# Patient Record
Sex: FEMALE | Race: WHITE | Employment: OTHER | URBAN - METROPOLITAN AREA
[De-identification: names, ages, dates, MRNs, and addresses within clinical notes are randomized per-mention and may not be internally consistent; named-entity substitution may affect disease eponyms.]

---

## 2018-07-20 RX ORDER — GUAIFENESIN AND PHENYLEPHRINE HCL 400; 10 MG/1; MG/1
1 TABLET ORAL DAILY
COMMUNITY

## 2018-07-24 ENCOUNTER — ANESTHESIA EVENT (OUTPATIENT)
Dept: ENDOSCOPY | Age: 68
End: 2018-07-24
Payer: MEDICARE

## 2018-07-24 ENCOUNTER — HOSPITAL ENCOUNTER (OUTPATIENT)
Age: 68
Setting detail: OUTPATIENT SURGERY
Discharge: HOME | End: 2018-07-24
Attending: SPECIALIST | Admitting: SPECIALIST
Payer: MEDICARE

## 2018-07-24 ENCOUNTER — ANESTHESIA (OUTPATIENT)
Dept: ENDOSCOPY | Age: 68
End: 2018-07-24
Payer: MEDICARE

## 2018-07-24 VITALS
HEIGHT: 66 IN | OXYGEN SATURATION: 100 % | WEIGHT: 168 LBS | BODY MASS INDEX: 27 KG/M2 | SYSTOLIC BLOOD PRESSURE: 114 MMHG | TEMPERATURE: 97.9 F | RESPIRATION RATE: 15 BRPM | HEART RATE: 61 BPM | DIASTOLIC BLOOD PRESSURE: 62 MMHG

## 2018-07-24 PROCEDURE — 88305 TISSUE EXAM BY PATHOLOGIST: CPT | Performed by: SPECIALIST

## 2018-07-24 PROCEDURE — 74011250636 HC RX REV CODE- 250/636

## 2018-07-24 PROCEDURE — 74011000250 HC RX REV CODE- 250

## 2018-07-24 PROCEDURE — 74011250636 HC RX REV CODE- 250/636: Performed by: PHYSICIAN ASSISTANT

## 2018-07-24 PROCEDURE — 77030009426 HC FCPS BIOP ENDOSC BSC -B: Performed by: SPECIALIST

## 2018-07-24 PROCEDURE — 76040000019: Performed by: SPECIALIST

## 2018-07-24 PROCEDURE — 76060000031 HC ANESTHESIA FIRST 0.5 HR: Performed by: SPECIALIST

## 2018-07-24 RX ORDER — ATROPINE SULFATE 0.1 MG/ML
0.5 INJECTION INTRAVENOUS
Status: DISCONTINUED | OUTPATIENT
Start: 2018-07-24 | End: 2018-07-24 | Stop reason: HOSPADM

## 2018-07-24 RX ORDER — NALOXONE HYDROCHLORIDE 0.4 MG/ML
0.4 INJECTION, SOLUTION INTRAMUSCULAR; INTRAVENOUS; SUBCUTANEOUS
Status: DISCONTINUED | OUTPATIENT
Start: 2018-07-24 | End: 2018-07-24 | Stop reason: HOSPADM

## 2018-07-24 RX ORDER — FLUMAZENIL 0.1 MG/ML
0.2 INJECTION INTRAVENOUS
Status: DISCONTINUED | OUTPATIENT
Start: 2018-07-24 | End: 2018-07-24 | Stop reason: HOSPADM

## 2018-07-24 RX ORDER — SODIUM CHLORIDE 9 MG/ML
50 INJECTION, SOLUTION INTRAVENOUS CONTINUOUS
Status: DISCONTINUED | OUTPATIENT
Start: 2018-07-24 | End: 2018-07-24 | Stop reason: HOSPADM

## 2018-07-24 RX ORDER — FENTANYL CITRATE 50 UG/ML
25 INJECTION, SOLUTION INTRAMUSCULAR; INTRAVENOUS AS NEEDED
Status: DISCONTINUED | OUTPATIENT
Start: 2018-07-24 | End: 2018-07-24 | Stop reason: HOSPADM

## 2018-07-24 RX ORDER — EPINEPHRINE 0.1 MG/ML
1 INJECTION INTRACARDIAC; INTRAVENOUS
Status: DISCONTINUED | OUTPATIENT
Start: 2018-07-24 | End: 2018-07-24 | Stop reason: HOSPADM

## 2018-07-24 RX ORDER — DEXTROMETHORPHAN/PSEUDOEPHED 2.5-7.5/.8
1.2 DROPS ORAL
Status: DISCONTINUED | OUTPATIENT
Start: 2018-07-24 | End: 2018-07-24 | Stop reason: HOSPADM

## 2018-07-24 RX ORDER — PROPOFOL 10 MG/ML
INJECTION, EMULSION INTRAVENOUS
Status: DISCONTINUED | OUTPATIENT
Start: 2018-07-24 | End: 2018-07-24 | Stop reason: HOSPADM

## 2018-07-24 RX ORDER — LIDOCAINE HYDROCHLORIDE 20 MG/ML
INJECTION, SOLUTION EPIDURAL; INFILTRATION; INTRACAUDAL; PERINEURAL AS NEEDED
Status: DISCONTINUED | OUTPATIENT
Start: 2018-07-24 | End: 2018-07-24 | Stop reason: HOSPADM

## 2018-07-24 RX ORDER — MIDAZOLAM HYDROCHLORIDE 1 MG/ML
.25-5 INJECTION, SOLUTION INTRAMUSCULAR; INTRAVENOUS AS NEEDED
Status: DISCONTINUED | OUTPATIENT
Start: 2018-07-24 | End: 2018-07-24 | Stop reason: HOSPADM

## 2018-07-24 RX ORDER — PROPOFOL 10 MG/ML
INJECTION, EMULSION INTRAVENOUS AS NEEDED
Status: DISCONTINUED | OUTPATIENT
Start: 2018-07-24 | End: 2018-07-24 | Stop reason: HOSPADM

## 2018-07-24 RX ADMIN — PROPOFOL 120 MCG/KG/MIN: 10 INJECTION, EMULSION INTRAVENOUS at 10:39

## 2018-07-24 RX ADMIN — SODIUM CHLORIDE 50 ML/HR: 900 INJECTION, SOLUTION INTRAVENOUS at 09:33

## 2018-07-24 RX ADMIN — LIDOCAINE HYDROCHLORIDE 40 MG: 20 INJECTION, SOLUTION EPIDURAL; INFILTRATION; INTRACAUDAL; PERINEURAL at 10:39

## 2018-07-24 RX ADMIN — PROPOFOL 60 MG: 10 INJECTION, EMULSION INTRAVENOUS at 10:39

## 2018-07-24 NOTE — H&P
78 y.o. female for open access colonoscopy for screening   Additional data for completion of the targeted pre-endoscopy H&P will be provided under 'H&P interval notes'. Please see that document which will be attached to this. Silvino Casiano MD      History of UC, apparently well controlled with mesalamine therapy, last colonoscopy 5 years previously.

## 2018-07-24 NOTE — PROCEDURES
[address] [facility] TRI Robertson MD  [phone]      2018    Colonoscopy Procedure Note  Alicia Andrews  :  1950  Jose Armando Medical Record Number: [id number]    Indications:     Screening colonoscopy, Ulcerative colitis  PCP:  Arnav Crum DO  Anesthesia/Sedation: Conscious Sedation/Moderate Sedation  Endoscopist:  Dr. Yen Thakkar  Complications:  None  Estimated Blood Loss:  None    Permit:  The indications, risks, benefits and alternatives were reviewed with the patient or their decision maker who was provided an opportunity to ask questions and all questions were answered. The specific risks of colonoscopy with conscious sedation were reviewed, including but not limited to anesthetic complication, bleeding, adverse drug reaction, missed lesion, infection, IV site reactions, and intestinal perforation which would lead to the need for surgical repair. Alternatives to colonoscopy including radiographic imaging, observation without testing, or laboratory testing were reviewed including the limitations of those alternatives. After considering the options and having all their questions answered, the patient or their decision maker provided both verbal and written consent to proceed. Procedure in Detail:  After obtaining informed consent, positioning of the patient in the left lateral decubitus position, and conduction of a pre-procedure pause or \"time out\" the endoscope was introduced into the anus and advanced to the cecum, which was identified by the ileocecal valve and appendiceal orifice. The quality of the colonic preparation was good. A careful inspection was made as the colonoscope was withdrawn, findings and interventions are described below. Findings:   Completely healthy mucosa throughout the colon.   Cold forceps biopsies taken from left colon and right colon for histologic evaluation. Specimens:    See above    Complications:   None; patient tolerated the procedure well. Impression:  No polyps or other neoplasia and her chronic IBD is in endoscopic (gross) remisison. Recommendations:     - Await pathology. - Continue current medications     - Consider repeat colonoscopy in 5-10 years    Thank you for entrusting me with this patient's care. Please do not hesitate to contact me with any questions or if I can be of assistance with any of your other patients' GI needs.     Signed By: Blas May MD                        July 24, 2018

## 2018-07-24 NOTE — ANESTHESIA POSTPROCEDURE EVALUATION
Post-Anesthesia Evaluation and Assessment    Patient: Mindy Devlin MRN: [id number]  SSN: [id number]    YOB: 1950  Age: 78 y.o. Sex: female       Cardiovascular Function/Vital Signs  Visit Vitals    /66    Pulse 62    Temp 36.6 °C (97.9 °F)    Resp 14    Ht 5' 6\" (1.676 m)    Wt 76.2 kg (168 lb)    SpO2 99%    Breastfeeding No    BMI 27.12 kg/m2       Patient is status post MAC anesthesia for Procedure(s):  COLONOSCOPY  COLON BIOPSY. Nausea/Vomiting: None    Postoperative hydration reviewed and adequate. Pain:  Pain Scale 1: Numeric (0 - 10) (07/24/18 1116)  Pain Intensity 1: 0 (07/24/18 1116)   Managed    Neurological Status: At baseline    Mental Status and Level of Consciousness: Arousable    Pulmonary Status:   O2 Device: Room air (07/24/18 1116)   Adequate oxygenation and airway patent    Complications related to anesthesia: None    Post-anesthesia assessment completed.  No concerns    Signed By: Dwayne Palencia MD     July 24, 2018

## 2018-07-24 NOTE — IP AVS SNAPSHOT
[address] 
[phone] Patient: Komal Cuenca MRN: [id number] RXC:2/3/4265 About your hospitalization You were admitted on:  July 24, 2018 You last received care in the:  [facility] ENDOSCOPY You were discharged on:  July 24, 2018 Why you were hospitalized Your primary diagnosis was:  Not on File Follow-up Information None Discharge Orders None A check katelynn indicates which time of day the medication should be taken. My Medications CONTINUE taking these medications Instructions Each Dose to Equal  
 Morning Noon Evening Bedtime  
 acyclovir 400 mg tablet Commonly known as:  ZOVIRAX Your last dose was: Your next dose is: Take 400 mg by mouth two (2) times a day. 400 mg  
    
   
   
   
  
 AMBIEN 10 mg tablet Generic drug:  zolpidem Your last dose was: Your next dose is: Take 5 mg by mouth nightly as needed for Sleep.  
 5 mg  
    
   
   
   
  
 aspirin 81 mg chewable tablet Your last dose was: Your next dose is: Take 81 mg by mouth every Monday, Wednesday, Friday. 81 mg  
    
   
   
   
  
 atenolol 25 mg tablet Commonly known as:  TENORMIN Your last dose was: Your next dose is: Take 25 mg by mouth daily. 25 mg  
    
   
   
   
  
 calcium carbonate 500 mg calcium (1,250 mg) tablet Commonly known as:  OS-VICENTA Your last dose was: Your next dose is: Take  by mouth daily. CeleXA 20 mg tablet Generic drug:  citalopram  
   
Your last dose was: Your next dose is: Take 20 mg by mouth daily. 20 mg  
    
   
   
   
  
 cyclobenzaprine 5 mg tablet Commonly known as:  FLEXERIL Your last dose was: Your next dose is: Take 5 mg by mouth as needed for Muscle Spasm(s). 5 mg  
    
   
   
   
  
 LIPITOR 20 mg tablet Generic drug:  atorvastatin Your last dose was: Your next dose is: Take 20 mg by mouth daily. 20 mg  
    
   
   
   
  
 mesalamine ER 0.375 gram 24 hour capsule Commonly known as:  APRISO Your last dose was: Your next dose is: Take 1.5 g by mouth daily. 1.5 g  
    
   
   
   
  
 turmeric root extract 500 mg Cap Your last dose was: Your next dose is: Take 1 Cap by mouth daily. 1 Cap Discharge Instructions [address] [place]. Gwendolyn Orona, [address] [place] [address] 
[phone] July 24, 2018 Norm Age YOB: 1950 COLONOSCOPY DISCHARGE INSTRUCTIONS If there is redness at IV site you should apply warm compress to area. If redness or soreness persist contact Dr. Gwendolyn Orona' or your primary care doctor. There may be a slight amount of blood passed from the rectum. Gaseous discomfort may develop, but walking, belching will help relieve this. You may not operate a vehicle for 12 hours You may not operate machinery or dangerous appliances for rest of today You may not drink alcoholic beverages for 12 hours Avoid making any critical decisions for 24 hours DIET: 
You may resume your normal diet, but some patients find that heavy or large meals may lead to indigestion or vomiting. I suggest a light meal as first food intake. MEDICATIONS: 
The use of some over-the-counter pain medication may lead to bleeding after colon biopsies or polyp removal.  Tylenol (also called acetaminophen) is safe to take even if you have had colonoscopy with polyp removal.  Based on the procedure you had today you may not safely take aspirin or aspirin-like products for the next ten (10) days.   Remember that Tylenol (also called acetaminophen) is safe to take after colonoscopy even if you have had biopsies or polyps removed. ACTIVITY: 
You may resume your normal household activities, but it is recommended that you spend the remainder of the day resting -  avoid any strenuous activity. CALL DR. Jennifer Sharp' OFFICE IF: Increasing pain, nausea, vomiting Abdominal distension (swelling) Significant new or increased bleeding (oral or rectal) Fever/Chills Chest pain/shortness of breath Additional instructions:  
No aspirin 10 days We found no polyps and the inflammation is very well controlled - no abnormalities seen today at all Plan is that you continue your current medications and we'll do another colonoscopy in 5-10 years. It was an honor to be your doctor today. Please let me or my office staff know if you have any feedback about today's procedure. Stacy Love MD 
 
Colonoscopy saves lives, and can prevent colon cancer. Everyone aged 48 or older needs colonoscopy. Tell your family and friends: get the test! 
 
 
 
 
  
  
  
Introducing [employer]inza[employer]! [facility] introduces [employer] River patient portal. Now you can access parts of your medical record, email your doctor's office, and request medication refills online. 1. In your internet browser, go to [email or web address] 2. Click on the First Time User? Click Here link in the Sign In box. You will see the New Member Sign Up page. 3. Enter your Digital River Access Code exactly as it appears below. You will not need to use this code after youve completed the sign-up process. If you do not sign up before the expiration date, you must request a new code. · Digital River Access Code: [email or web address] Expires: 10/22/2018  9:01 AM 
 
4. Enter the last four digits of your Social Security Number (xxxx) and Date of Birth (mm/dd/yyyy) as indicated and click Submit. You will be taken to the next sign-up page. 5. Create a TennisHub ID. This will be your TennisHub login ID and cannot be changed, so think of one that is secure and easy to remember. 6. Create a TennisHub password. You can change your password at any time. 7. Enter your Password Reset Question and Answer. This can be used at a later time if you forget your password. 8. Enter your e-mail address. You will receive e-mail notification when new information is available in [address] 9. Click Sign Up. You can now view and download portions of your medical record. 10. Click the Download Summary menu link to download a portable copy of your medical information. If you have questions, please visit the Frequently Asked Questions section of the TennisHub website. Remember, TennisHub is NOT to be used for urgent needs. For medical emergencies, dial 911. Now available from your iPhone and Android! Introducing Ellis Richards As a [facility] patient, I wanted to make you aware of our electronic visit tool called Ellis Matthewlisandrokiara. [facility] 24/7 allows you to connect within minutes with a medical provider 24 hours a day, seven days a week via a mobile device or tablet or logging into a secure website from your computer. You can access Ellis Richards from anywhere in the [place]. A virtual visit might be right for you when you have a simple condition and feel like you just dont want to get out of bed, or cant get away from work for an appointment, when your regular [facility] provider is not available (evenings, weekends or holidays), or when youre out of town and need minor care. Electronic visits cost only $49 and if the [facility] [facility] 24/7 provider determines a prescription is needed to treat your condition, one can be electronically transmitted to a nearby pharmacy*. Please take a moment to enroll today if you have not already done so. The enrollment process is free and takes just a few minutes.   To enroll, please download the [employer] 24/7 yadira to your tablet or phone, or visit [email or web address] to enroll on your computer. And, as an [address] [place] [address] patient with a IntegriChain account, the results of your visits will be scanned into your electronic medical record and your primary care provider will be able to view the scanned results. We urge you to continue to see your regular [employer] provider for your ongoing medical care. And while your primary care provider may not be the one available when you seek a yuback virtual visit, the peace of mind you get from getting a real diagnosis real time can be priceless. For more information on yuback, view our Frequently Asked Questions (FAQs) at [email or web address]. Sincerely, 
 
Joshua Cardoso MD 
Chief Medical Officer [employer] *:  certain medications cannot be prescribed via yuback Providers Seen During Your Hospitalization Provider Specialty Primary office phone Marcial Maria MD Gastroenterology [phone] Your Primary Care Physician (PCP) Primary Care Physician Office Phone Office Fax Nerissa Marie [phone] You are allergic to the following Allergen Reactions Adhesive Tape-Silicones Contact Dermatitis Wellbutrin (Bupropion) Rash Recent Documentation Height Weight Breastfeeding? BMI OB Status Smoking Status 1.676 m 76.2 kg No 27.12 kg/m2 Postmenopausal Former Smoker Emergency Contacts Name Discharge Info Relation Home Work Mobile Ching Montague CAREGIVER [3] Spouse [3] [phone] Patient Belongings The following personal items are in your possession at time of discharge: 
  Dental Appliances: None  Visual Aid: Glasses, At bedside Please provide this summary of care documentation to your next provider. Signatures-by signing, you are acknowledging that this After Visit Summary has been reviewed with you and you have received a copy. Patient Signature:  ____________________________________________________________ Date:  ____________________________________________________________  
  
Althia Kras Provider Signature:  ____________________________________________________________ Date:  ____________________________________________________________

## 2018-07-24 NOTE — ROUTINE PROCESS
[facility]  1950  [id number]    Situation:  Verbal report received from: Marciano Aragon RN  Procedure: Procedure(s):  COLONOSCOPY  COLON BIOPSY    Background:    Preoperative diagnosis: FAMILY HISTORY OF MALIGNANT NEOPLASM OF GI TRACT, ULCERATIVE COLITIS  Postoperative diagnosis: * No post-op diagnosis entered *    :  Dr. Matilde Monahan  Assistant(s): Endoscopy Technician-1: Kashif Leggett  Endoscopy RN-1: Anyi Mitchell RN    Specimens:   ID Type Source Tests Collected by Time Destination   1 : Right Colon biopsy Preservative   Carole Kirby MD 7/24/2018 1053 Pathology   2 : Left Colon Biopsy Preservative   Carole Kirby MD 7/24/2018 1054 Pathology     H. Pylori  no    Assessment:  Intra-procedure medications     Anesthesia gave intra-procedure sedation and medications, see anesthesia flow sheet yes    Intravenous fluids: NS@ KVO     Vital signs stable     Abdominal assessment: round and soft     Recommendation:  Discharge patient per MD order. Family or Friend   Permission to share finding with family or friend yes    Endoscopy discharge instructions have been reviewed and given to patient and spouse. The patient and spouse verbalized understanding and acceptance of instructions.

## 2018-07-24 NOTE — DISCHARGE INSTRUCTIONS
[address] [facility] TRI Ellington MD  [phone]      July 24, 2018    Kamran Linn  YOB: 1950    COLONOSCOPY DISCHARGE INSTRUCTIONS    If there is redness at IV site you should apply warm compress to area. If redness or soreness persist contact Dr. Dionna Ellington' or your primary care doctor. There may be a slight amount of blood passed from the rectum. Gaseous discomfort may develop, but walking, belching will help relieve this. You may not operate a vehicle for 12 hours  You may not operate machinery or dangerous appliances for rest of today  You may not drink alcoholic beverages for 12 hours  Avoid making any critical decisions for 24 hours    DIET:  You may resume your normal diet, but some patients find that heavy or large meals may lead to indigestion or vomiting. I suggest a light meal as first food intake. MEDICATIONS:  The use of some over-the-counter pain medication may lead to bleeding after colon biopsies or polyp removal.  Tylenol (also called acetaminophen) is safe to take even if you have had colonoscopy with polyp removal.  Based on the procedure you had today you may not safely take aspirin or aspirin-like products for the next ten (10) days. Remember that Tylenol (also called acetaminophen) is safe to take after colonoscopy even if you have had biopsies or polyps removed. ACTIVITY:  You may resume your normal household activities, but it is recommended that you spend the remainder of the day resting -  avoid any strenuous activity.     CALL DR. Froilan Nobles' OFFICE IF:  Increasing pain, nausea, vomiting  Abdominal distension (swelling)  Significant new or increased bleeding (oral or rectal)  Fever/Chills  Chest pain/shortness of breath                       Additional instructions:   No aspirin 10 days  We found no polyps and the inflammation is very well controlled - no abnormalities seen today at all  Plan is that you continue your current medications and we'll do another colonoscopy in 5-10 years. It was an honor to be your doctor today. Please let me or my office staff know if you have any feedback about today's procedure. Ayden Clarke MD    Colonoscopy saves lives, and can prevent colon cancer. Everyone aged 48 or older needs colonoscopy.   Tell your family and friends: get the test!

## 2018-07-24 NOTE — ANESTHESIA PREPROCEDURE EVALUATION
Anesthetic History   No history of anesthetic complications            Review of Systems / Medical History  Patient summary reviewed and pertinent labs reviewed    Pulmonary        Sleep apnea: No treatment           Neuro/Psych         Psychiatric history     Cardiovascular            Dysrhythmias         Comments: Atrial tachycardua   GI/Hepatic/Renal     GERD           Endo/Other  Within defined limits           Other Findings              Physical Exam    Airway  Mallampati: II  TM Distance: 4 - 6 cm  Neck ROM: normal range of motion   Mouth opening: Normal     Cardiovascular  Regular rate and rhythm,  S1 and S2 normal,  no murmur, click, rub, or gallop  Rhythm: regular  Rate: normal         Dental  No notable dental hx       Pulmonary  Breath sounds clear to auscultation               Abdominal  GI exam deferred       Other Findings            Anesthetic Plan    ASA: 2  Anesthesia type: MAC          Induction: Intravenous  Anesthetic plan and risks discussed with: Patient

## 2018-07-24 NOTE — PROGRESS NOTES
Report received from Pb Graham (JHONATAN). See anesthesia note. ABD remains soft and non-tender post procedure. Pt has no complaints at this time and tolerated the procedure well. Endoscope was pre-cleaned at bedside immediately following procedure by Octavio Bernabe.

## 2020-01-03 NOTE — PERIOP NOTES
[address]  Endoscopy Preprocedure Instructions      1. On the day of your surgery, please report to registration located on the 2nd floor of the  [facility]. yes    2. You must have a responsible adult to drive you to the hospital, stay at the hospital during your procedure and drive you home. If they leave your procedure will not be started (no exceptions). yes    3. Do not have anything to eat or drink (including water, gum, mints, coffee, and juice) after midnight. This does not apply to the medications you were instructed to take by your physician. yesIf you are currently taking Plavix, Coumadin, Aspirin, or other blood-thinning agents, contact your physician for special instructions. yes,    4. If you are having a procedure that requires bowel prep: We recommend that you have only clear liquids the day before your procedure and begin your bowel prep by 5:00 pm.  You may continue to drink clear liquids until midnight. If for any reason you are not able to complete your prep please notify your physician immediately. yes    5. Have a list of all current medications, including vitamins, herbal supplements and any other over the counter medications. yes    6. If you wear glasses, contacts, dentures and/or hearing aids, they may be removed prior to procedure, please bring a case to store them in. yes    7. You should understand that if you do not follow these instructions your procedure may be cancelled. If your physical condition changes (I.e. fever, cold or flu) please contact your doctor as soon as possible. 8. It is important that you be on time.   If for any reason you are unable to keep your appointment please call [phone] the day of or your physicians office prior to your scheduled procedure

## 2020-01-07 ENCOUNTER — ANESTHESIA EVENT (OUTPATIENT)
Dept: ENDOSCOPY | Age: 70
End: 2020-01-07
Payer: MEDICARE

## 2020-01-07 ENCOUNTER — HOSPITAL ENCOUNTER (OUTPATIENT)
Age: 70
Setting detail: OUTPATIENT SURGERY
Discharge: HOME | End: 2020-01-07
Attending: SPECIALIST | Admitting: SPECIALIST
Payer: MEDICARE

## 2020-01-07 ENCOUNTER — ANESTHESIA (OUTPATIENT)
Dept: ENDOSCOPY | Age: 70
End: 2020-01-07
Payer: MEDICARE

## 2020-01-07 VITALS
BODY MASS INDEX: 27.6 KG/M2 | WEIGHT: 171.74 LBS | OXYGEN SATURATION: 100 % | SYSTOLIC BLOOD PRESSURE: 108 MMHG | RESPIRATION RATE: 18 BRPM | DIASTOLIC BLOOD PRESSURE: 64 MMHG | HEIGHT: 66 IN | HEART RATE: 64 BPM | TEMPERATURE: 97.7 F

## 2020-01-07 PROCEDURE — 76060000031 HC ANESTHESIA FIRST 0.5 HR: Performed by: SPECIALIST

## 2020-01-07 PROCEDURE — 74011250636 HC RX REV CODE- 250/636: Performed by: NURSE ANESTHETIST, CERTIFIED REGISTERED

## 2020-01-07 PROCEDURE — 77030013992 HC SNR POLYP ENDOSC BSC -B: Performed by: SPECIALIST

## 2020-01-07 PROCEDURE — 88305 TISSUE EXAM BY PATHOLOGIST: CPT

## 2020-01-07 PROCEDURE — 76040000019: Performed by: SPECIALIST

## 2020-01-07 PROCEDURE — 77030021593 HC FCPS BIOP ENDOSC BSC -A: Performed by: SPECIALIST

## 2020-01-07 RX ORDER — SODIUM CHLORIDE 9 MG/ML
INJECTION, SOLUTION INTRAVENOUS
Status: DISCONTINUED | OUTPATIENT
Start: 2020-01-07 | End: 2020-01-07 | Stop reason: HOSPADM

## 2020-01-07 RX ORDER — DEXTROMETHORPHAN POLISTIREX 30 MG/5 ML
SUSPENSION, EXTENDED RELEASE 12 HR ORAL
COMMUNITY
End: 2021-06-30

## 2020-01-07 RX ORDER — MIDAZOLAM HYDROCHLORIDE 1 MG/ML
.25-5 INJECTION, SOLUTION INTRAMUSCULAR; INTRAVENOUS AS NEEDED
Status: DISCONTINUED | OUTPATIENT
Start: 2020-01-07 | End: 2020-01-07 | Stop reason: HOSPADM

## 2020-01-07 RX ORDER — PROPOFOL 10 MG/ML
INJECTION, EMULSION INTRAVENOUS
Status: DISCONTINUED | OUTPATIENT
Start: 2020-01-07 | End: 2020-01-07 | Stop reason: HOSPADM

## 2020-01-07 RX ORDER — FENTANYL CITRATE 50 UG/ML
25 INJECTION, SOLUTION INTRAMUSCULAR; INTRAVENOUS AS NEEDED
Status: DISCONTINUED | OUTPATIENT
Start: 2020-01-07 | End: 2020-01-07 | Stop reason: HOSPADM

## 2020-01-07 RX ORDER — DEXTROMETHORPHAN/PSEUDOEPHED 2.5-7.5/.8
1.2 DROPS ORAL
Status: DISCONTINUED | OUTPATIENT
Start: 2020-01-07 | End: 2020-01-07 | Stop reason: HOSPADM

## 2020-01-07 RX ORDER — FLUMAZENIL 0.1 MG/ML
0.2 INJECTION INTRAVENOUS
Status: DISCONTINUED | OUTPATIENT
Start: 2020-01-07 | End: 2020-01-07 | Stop reason: HOSPADM

## 2020-01-07 RX ORDER — PROPOFOL 10 MG/ML
INJECTION, EMULSION INTRAVENOUS AS NEEDED
Status: DISCONTINUED | OUTPATIENT
Start: 2020-01-07 | End: 2020-01-07 | Stop reason: HOSPADM

## 2020-01-07 RX ORDER — BUDESONIDE 3 MG/1
9 CAPSULE, COATED PELLETS ORAL
COMMUNITY
End: 2021-06-30

## 2020-01-07 RX ORDER — ATROPINE SULFATE 0.1 MG/ML
0.5 INJECTION INTRAVENOUS
Status: DISCONTINUED | OUTPATIENT
Start: 2020-01-07 | End: 2020-01-07 | Stop reason: HOSPADM

## 2020-01-07 RX ORDER — SODIUM CHLORIDE 9 MG/ML
50 INJECTION, SOLUTION INTRAVENOUS CONTINUOUS
Status: DISCONTINUED | OUTPATIENT
Start: 2020-01-07 | End: 2020-01-07 | Stop reason: HOSPADM

## 2020-01-07 RX ORDER — NALOXONE HYDROCHLORIDE 0.4 MG/ML
0.4 INJECTION, SOLUTION INTRAMUSCULAR; INTRAVENOUS; SUBCUTANEOUS
Status: DISCONTINUED | OUTPATIENT
Start: 2020-01-07 | End: 2020-01-07 | Stop reason: HOSPADM

## 2020-01-07 RX ORDER — EPINEPHRINE 0.1 MG/ML
1 INJECTION INTRACARDIAC; INTRAVENOUS
Status: DISCONTINUED | OUTPATIENT
Start: 2020-01-07 | End: 2020-01-07 | Stop reason: HOSPADM

## 2020-01-07 RX ORDER — MESALAMINE 1000 MG/1
SUPPOSITORY RECTAL 2 TIMES DAILY
COMMUNITY
End: 2021-06-30

## 2020-01-07 RX ADMIN — PROPOFOL 100 MG: 10 INJECTION, EMULSION INTRAVENOUS at 10:29

## 2020-01-07 RX ADMIN — PROPOFOL 100 MCG/KG/MIN: 10 INJECTION, EMULSION INTRAVENOUS at 10:29

## 2020-01-07 RX ADMIN — SODIUM CHLORIDE: 9 INJECTION, SOLUTION INTRAVENOUS at 10:15

## 2020-01-07 NOTE — ANESTHESIA PREPROCEDURE EVALUATION
Relevant Problems   No relevant active problems       Anesthetic History   No history of anesthetic complications            Review of Systems / Medical History  Patient summary reviewed, nursing notes reviewed and pertinent labs reviewed    Pulmonary        Sleep apnea           Neuro/Psych         Psychiatric history     Cardiovascular  Within defined limits          Dysrhythmias (paroxysmal atrial tachycardia)            GI/Hepatic/Renal  Within defined limits              Endo/Other  Within defined limits           Other Findings              Physical Exam    Airway  Mallampati: II  TM Distance: 4 - 6 cm  Neck ROM: normal range of motion   Mouth opening: Normal     Cardiovascular    Rhythm: regular  Rate: normal         Dental  No notable dental hx       Pulmonary  Breath sounds clear to auscultation               Abdominal         Other Findings            Anesthetic Plan    ASA: 2  Anesthesia type: MAC            Anesthetic plan and risks discussed with: Patient

## 2020-01-07 NOTE — ANESTHESIA POSTPROCEDURE EVALUATION
Procedure(s):  COLONOSCOPY  ENDOSCOPIC POLYPECTOMY  COLON BIOPSY. MAC    Anesthesia Post Evaluation        Patient location during evaluation: bedside  Level of consciousness: awake  Pain management: satisfactory to patient  Airway patency: patent  Anesthetic complications: no  Cardiovascular status: acceptable  Respiratory status: acceptable  Hydration status: acceptable        Vitals Value Taken Time   /64 1/7/2020 11:15 AM   Temp 36.5 °C (97.7 °F) 1/7/2020 10:56 AM   Pulse 61 1/7/2020 11:16 AM   Resp 16 1/7/2020 11:16 AM   SpO2 100 % 1/7/2020 11:16 AM   Vitals shown include unvalidated device data.

## 2020-01-07 NOTE — INTERVAL H&P NOTE
Pre-Endoscopy H&P Update  Chief complaint/HPI/ROS:  The indication for the procedure, the patient's history and the patient's current medications are reviewed prior to the procedure and that data is reported on the H&P to which this document is attached. Any significant complaints with regard to organ systems will be noted, and if not mentioned then a review of systems is not contributory. Past Medical History:   Diagnosis Date    Arrhythmia     a tach     Atrial tachycardia ([address]     Colitis, chronic, ulcerative ([address]     GERD (gastroesophageal reflux disease)     Palpitations     Sleep apnea     has lost weight, better now. 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([address]       Past Surgical History:   Procedure Laterality Date    BREAST SURGERY PROCEDURE UNLISTED      lumpectomy     COLONOSCOPY N/A 2018    COLONOSCOPY performed by Rita Ryan MD at [address] History     Tobacco Use    Smoking status: Former Smoker     Last attempt to quit:      Years since quittin.0    Smokeless tobacco: Never Used   Substance Use Topics    Alcohol use: No      Family History   Problem Relation Age of Onset    Other Father         lung and colon cancer    Cancer Father     Cancer Mother     Diabetes Paternal Uncle       Allergies   Allergen Reactions    Adhesive Tape-Silicones Contact Dermatitis    Wellbutrin [Bupropion] Rash      Prior to Admission Medications   Prescriptions Last Dose Informant Patient Reported? Taking?   acyclovir (ZOVIRAX) 400 mg tablet 2020 at Unknown time  Yes Yes   Sig: Take 400 mg by mouth two (2) times a day. aspirin 81 mg chewable tablet Not Taking at Unknown time  Yes No   Sig: Take 81 mg by mouth every Monday, Wednesday, Friday. atenolol (TENORMIN) 25 mg tablet 2020 at Unknown time  Yes Yes   Sig: Take 25 mg by mouth daily.    atorvastatin (LIPITOR) 20 mg tablet 2020 at Unknown time Yes Yes   Sig: Take 20 mg by mouth daily. budesonide (ENTOCORT EC) 3 mg capsule 1/6/2020 at Unknown time  Yes Yes   Sig: Take 9 mg by mouth every morning. calcium carbonate (OS-VICENTA) 500 mg calcium (1,250 mg) tablet Not Taking at Unknown time  Yes No   Sig: Take  by mouth daily. citalopram (CELEXA) 20 mg tablet 1/6/2020 at Unknown time  Yes Yes   Sig: Take 20 mg by mouth daily. cyclobenzaprine (FLEXERIL) 5 mg tablet 1/5/2020 at Unknown time  Yes Yes   Sig: Take 5 mg by mouth as needed for Muscle Spasm(s). mesalamine (CANASA) 1,000 mg suppository 1/5/2020  Yes Yes   Sig: Insert  into rectum two (2) times a day. mesalamine ER (APRISO) 0.375 gram capsule 1/6/2020 at Unknown time  Yes Yes   Sig: Take 1.5 g by mouth daily. mineral oil (FLEET) enema 1/4/2020  Yes Yes   Sig: Insert  into rectum nightly. Mesalamine enema with flare ups only. turmeric root extract 500 mg cap 1/6/2020 at Unknown time  Yes Yes   Sig: Take 1 Cap by mouth daily. zolpidem (AMBIEN) 10 mg tablet 12/17/2019 at Unknown time  Yes Yes   Sig: Take 5 mg by mouth nightly as needed for Sleep. Facility-Administered Medications: None       PHYSICAL EXAM:  The patient is examined immediately prior to the procedure. Visit Vitals  /57   Pulse 69   Temp 98.7 °F (37.1 °C)   Resp 17   Ht 5' 6\" (1.676 m)   Wt 77.9 kg (171 lb 11.8 oz)   SpO2 100%   Breastfeeding No   BMI 27.72 kg/m²     Gen: Appears comfortable, no distress. Pulm: comfortable respirations with no abnormal audible breath sounds  HEART: well perfused, no abnormal audible heart sounds  GI: abdomen flat. PLAN:  Informed consent discussion held, patient afforded an opportunity to ask questions and all questions answered. After being advised of the risks, benefits, and alternatives, the patient requested that we proceed and indicated so on a written consent form. Will proceed with procedure as planned.   Radha Washington MD

## 2020-01-07 NOTE — PROGRESS NOTES
Jayjay Ephraim  1950  [id number]    Situation:  Verbal report received from:   Milka Mccann RN   Procedure: Procedure(s):  COLONOSCOPY  ENDOSCOPIC POLYPECTOMY  COLON BIOPSY    Background:    Preoperative diagnosis: ULCERATIVE COLITIS  Postoperative diagnosis: 1.- Colonic (Cecum)Polyp  2.- Ulcerative Colitis    :  Dr. Monse Veliz   Assistant(s): Endoscopy Technician-1: Jesse Meyer  Endoscopy RN-1: Owen Brown RN    Specimens:   ID Type Source Tests Collected by Time Destination   1 : Cecum Polyp Preservative   Toby Wagner MD 1/7/2020 1040 Pathology   2 : Right Colon Biopsies Preservative   Toby Wagner MD 1/7/2020 1045 Pathology   3 : Left Colon Biopsies Preservative   Toby Wagner MD 1/7/2020 1045 Pathology     H. Pylori  no    Assessment:  Intra-procedure medications     Anesthesia gave intra-procedure sedation and medications, see anesthesia flow sheet yes    Intravenous fluids: NS@ KVO     Vital signs stable   yes    Abdominal assessment: round and soft   yes    Recommendation:  Discharge patient per MD order  yes.   Return to floor  Outpatient   Family or Friend   Spouse   Permission to share finding with family or friend yes

## 2020-01-07 NOTE — PROGRESS NOTES

## 2020-01-07 NOTE — PROCEDURES
[address] D. Jacky Cuecna MD  [phone]      2020    Colonoscopy Procedure Note  Billy Bundy  :  1950  Jose Armando Medical Record Number: [id number]    Indications:     Hematochezia/melena  PCP:  Wu Gardner DO  Anesthesia/Sedation: Conscious Sedation/Moderate Sedation/GETA, see notes  Endoscopist:  Dr. Joanie Larsen  Complications:  None  Estimated Blood Loss:  None    Permit:  The indications, risks, benefits and alternatives were reviewed with the patient or their decision maker who was provided an opportunity to ask questions and all questions were answered. The specific risks of colonoscopy with conscious sedation were reviewed, including but not limited to anesthetic complication, bleeding, adverse drug reaction, missed lesion, infection, IV site reactions, and intestinal perforation which would lead to the need for surgical repair. Alternatives to colonoscopy including radiographic imaging, observation without testing, or laboratory testing were reviewed including the limitations of those alternatives. After considering the options and having all their questions answered, the patient or their decision maker provided both verbal and written consent to proceed. Procedure in Detail:  After obtaining informed consent, positioning of the patient in the left lateral decubitus position, and conduction of a pre-procedure pause or \"time out\" the endoscope was introduced into the anus and advanced to the terminal ileum. The quality of the colonic preparation was good. A careful inspection was made as the colonoscope was withdrawn, findings and interventions are described below. Findings:   Severe changes of ulcerative colitis from 0-45cm. Cold forceps biopsies taken from left colon and right colon.   A single 5mm polyp is noted in the cecum removed with cold snare and all samples retrieved. The right colonic mucosa and terminal ileum are free of any significant gross inflammatory changes. Specimens:    See above    Complications:   None; patient tolerated the procedure well. Impression:  Significant / severe mucosal inflammation of the left colon despite glucocorticoid and mesalamine therapy. Recommendations:     - Await pathology. Likely will need to advance to biologic therapy (vedolizumab). Will begin authorization process. Thank you for entrusting me with this patient's care. Please do not hesitate to contact me with any questions or if I can be of assistance with any of your other patients' GI needs. Signed By: Jeannie Gaines MD                        January 7, 2020      Surgical assistant none. Implants none unless specified.

## 2020-01-07 NOTE — H&P
Date: 2019 9:30 AM   Patient Name: Sylvia Palacios   Account #: [id number]    Gender: Female    (age): 1950 (77)       Provider:     Larry Flower. Adi Burris        Referring Physician:     Sanjuana Gusman DO  [address], [address]  [phone] (phone)  [phone] (fax)        Chief Complaint: rectal bleeding           History of Present Illness:   68yo female presents today for a follow up visit. Patient has been followed for ulcerative colitis/proctitis. IN  she was seen for recurrent rectal bleeding. At that time suspected to be hemorrhoidal bleeding. Symptoms initially responded with hydrocortisone suppositories. Patient reports that 3 months ago she started to experience painless rectal bleeding. Associated with fecal urgency. States that 12+ times per days she has \"spurts of blood\" which she describes as bright red to pink or purple blood. Medium amount. Bowel movements are soft and she is going every few days. Denies fever, chills, lightheadedness/dizziness, skin rash, abdominal pain, nausea, vomiting, or weight loss. No rectal pain. No change in appetite. No new medications. Current UC maintenance regimen: Apriso 0.375gram 4 capsules daily, mesalamine enema and intermittent use of Canasa suppository. No recent steroid use. No prior biologic use. 724.18 Colonoscopy - Findings: Completely healthy mucosa throughout the colon. Cold forceps biopsies takenfrom left colon and right colon for histologic evaluation. Bx unremarkable? 68yo female presents today for a follow up visit. Patient has been followed for ulcerative colitis/proctitis. IN  she was seen for recurrent rectal bleeding. At that time suspected to be hemorrhoidal bleeding. Symptoms initially responded with hydrocortisone suppositories. Patient reports that 3 months ago she started to experience painless rectal bleeding. Associated with fecal urgency.  States that 12+ times per days she has \"spurts of blood\" which she describes as bright red to pink or purple blood. Medium amount. Bowel movements are soft and she is going every few days. Denies fever, chills, lightheadedness/dizziness, skin rash, abdominal pain, nausea, vomiting, or weight loss. No rectal pain. No change in appetite. No new medications. Current UC maintenance regimen: Apriso 0.375gram 4 capsules daily, mesalamine enema and intermittent use of Canasa suppository. No recent steroid use. No prior biologic use. 7.24.18 Colonoscopy - Findings: Completely healthy mucosa throughout the colon. Cold forceps biopsies takenfrom left colon and right colon for histologic evaluation. Bx unremarkable? Past Medical History      Medical Conditions: Atrial Tachycardia  Bowel Disease (UC/Crohn's)  Colitis  Sleep apnea   Surgical Procedures: Breast Lumpectomy, 1998  Tonsillectomy, 1963   Dx Studies: Colonoscopy, 2018  CT Scan   Medications: acyclovir 400 mg  Apriso 0.375 gram TAKE 4 CAPSULES BY MOUTH ONCE A DAY ALL AT ONCE  atorvastatin 20 mg TAKE ONE TABLET BY MOUTH ONCE DAILY  Canasa 1,000 mg Insert 1 suppository into rectum at bedtime  citalopram 20 mg TAKE ONE (1) TABLET(S) DAILY 30 DAYS  cyclobenzaprine 10 mg  melatonin 5 mg  mesalamine 4 gram/60 mL Insert 1 bottle as enema at bedtime  turmeric-turmeric root extract 450-50 mg  zolpidem 5 mg   Allergies: adhesive tape  Wellbutrin   Immunizations: Influenza, seasonal, injectable, 11/1/2019  zoster, 2018  Flu vaccine, 10/2016, 2018  Pneumococcal conjugate PCV 13, 2018, 2019      Social History      Alcohol: None   Tobacco: Former smokerOther, quit 2004. Drugs: None   Exercise: Exercise 3 or more times a week. Walking/ Running not often. Caffeine: Daily. Marital Status:          Occupation:               Family History No history of Colon Cancer, Esophogeal Cancer, GI Cancers, IBD (Crohn's or UC), Liver disease  Father: Diagnosed with colon cancer, colon polyps, Lung Cancer;    Mother: Diagnosed with Lung Cancer;       Review of Systems:   Cardiovascular: Denies chest pain, irregular heart beat, palpitations, peripheral edema, syncope, Sweats. Constitutional: Denies fatigue, fever, loss of appetite, weight gain, weight loss. ENMT: Denies nose bleeds, sore throat, hearing loss. Endocrine: Denies excessive thirst, heat intolerance. Eyes: Denies loss of vision. Gastrointestinal: Presents suffers from rectal bleeding. Denies abdominal pain, abdominal swelling, change in bowel habits, constipation, diarrhea, Bloating/gas, heartburn, jaundice, nausea, stomach cramps, vomiting, dysphagia, rectal pain, Stool incontinence, hematemesis. Genitourinary: Denies dark urine, dysuria, frequent urination, hematuria, incontinence. Hematologic/Lymphatic: Denies easy bruising, prolonged bleeding. Integumentary: Denies itching, rashes, sun sensitivity. Musculoskeletal: Denies arthritis, back pain, gout, joint pain, muscle weakness, stiffness. Neurological: Denies dizziness, fainting, frequent headaches, memory loss. Psychiatric: Denies anxiety, depression, difficulty sleeping, hallucinations, nervousness, panic attacks, paranoia. Respiratory: Denies cough, dyspnea, wheezing. Vital Signs:   BP  (mmHg)  Pulse  (ppm) Weight (lbs/oz) Height (ft/in) BMI   107/71 66 176 /  5 / 6 28.4      Physical Exam:   Constitutional:      Appearance: No distress, appears comfortable. Communication: Understands/receives spoken information. Skin:      Inspection: No rash, no jaundice. Head/face: Inspection: Normacephalic, atraumatic. Palpation: normal.   Eyes:      Conjunctivae/lids: Normal.   Pupils/irises: Pupils equal, round and normal.   ENMT:      External: Normal.   Hearing: Normal.   Neck:      Neck: Normal appearance, trachea midline. Respiratory:      Effort: Normal respiratory effort, comfortable, speaks in complete sentences.    Auscultation: normal breath sounds, no rubs, wheezes or rhonchi. Cardiovascular: Auscultation: normal, S1 and S2, no gallops , no rubs or murmurs . Gastrointestinal/Abdomen:      Abdomen: bowel sounds present, soft, nondistended, nontender. Psychiatric:      Judgment/insight: Normal, normal judgement, normal insight. Orientation: oriented to time, space and person. Memory: normal short term memory, normal long term memory, no memory loss. Mood and affect: Normal mood, affect full, no evidence of depression, anxiety or agitation. Lab Results:      Test 4/29/2014 6/27/2013 Units Limits   Comp.  Metabolic Panel (14)       A/G Ratio 2.2   1.1 - 2.5   Albumin, Serum 4.4  g/dL 3.6 - 4.8   Alkaline Phosphatase, S 67  IU/L 39 - 117   ALT (SGPT) 11  IU/L 0 - 32   AST (SGOT) 12  IU/L 0 - 40   Bilirubin, Total 0.3  mg/dL 0 - 1.2   BUN 17  mg/dL 8 - 27   BUN/Creatinine Ratio 15   11 - 26   Calcium, Serum 9.6  mg/dL 8.6 - 10.2   Carbon Dioxide, Total 30  mmol/L 19 - 28   Chloride, Serum 101  mmol/L 97 - 108   Creatinine, Serum 1.14  mg/dL 0.57 - 1   eGFR If Africn Am 60  mL/min/1.73 >59   eGFR If NonAfricn Am 52  mL/min/1.73 >59   Globulin, Total 2.0  g/dL 1.5 - 4.5   Glucose, Serum 84  mg/dL 65 - 99   Potassium, Serum 4.4  mmol/L 3.5 - 5.2   Protein, Total, Serum 6.4  g/dL 6 - 8.5   Sodium, Serum 140  mmol/L 134 - 144   CBC With Differential/Platelet       Baso (Absolute) 0.0 0.0 x10E3/uL 0 - 0.2   Basos 0 0 % 0 - 3   Eos 3 2 % 0 - 7   Eos (Absolute) 0.1 0.2 x10E3/uL 0 - 0.4   Hematocrit 39.4 38.5 % 34 - 46.6   Hematology Comments:       Hemoglobin 13.3 13.0 g/dL 11.1 - 15.9   Immature Cells       Immature Grans (Abs) 0.0 0.0 x10E3/uL 0 - 0.1   Immature Granulocytes 0 0 % 0 - 2   Lymphs 36 19 % 14 - 46   Lymphs (Absolute) 1.8 1.9 x10E3/uL 0.7 - 4.5   MCH 31.7 31.3 pg 26.6 - 33   MCHC 33.8 33.8 g/dL 31.5 - 35.7   MCV 94 93 fL 79 - 97   Monocytes 7 5 % 4 - 13   Monocytes(Absolute) 0.3 0.5 x10E3/uL 0.1 - 1   Neutrophils 54 74 % 40 - 74   Neutrophils (Absolute) 2.6 7.0 x10E3/uL 1.8 - 7.8   NRBC       Platelets 387 873 L37S9/ - 415   RBC 4.20 4.15 x10E6/uL 3.77 - 5.28   RDW 13.1 12.9 % 12.3 - 15.4   WBC 5.0 9.5 x10E3/uL 4 - 10.5   Ferritin, Serum       Ferritin, Serum  97 ng/mL 15 - 150   Iron and TIBC       Iron Bind. Cap.(TIBC)  261 ug/dL 250 - 450   Iron Saturation  24 % 15 - 55   Iron, Serum  62 ug/dL 35 - 155   UIBC  199 ug/dL 150 - 375     Impressions: Ulcerative colitis  Painless rectal bleeding? ? Ulcerative colitis? ?  ? ? Painless rectal bleeding? Assessment: Symptoms consistent with UC flare. Patient is hemodynamically stable and does not have any systemic symptoms. Plan for repeat colonoscopy to reassess the extent of her disease. Check for anemia and screen for Hep B and TB in case initial of biologics warranted. For now will add course of budesonide for treatment to help induce clinical remission. ? Symptoms consistent with UC flare. Patient is hemodynamically stable and does not have any systemic symptoms. Plan for repeat colonoscopy to reassess the extent of her disease. Check for anemia and screen for Hep B and TB in case initial of biologics warranted. For now will add course of budesonide for treatment to help induce clinical remission. Plan: CBC w/ Diff w/ Platelet ([facility] #[id number])  Iron and Total Binding Capacity ([facility] #[id number])  Ferritin ([facility] #[id number])  QuantiFERON TB Gold Lab [facility] #[id number])  TPMT Enzyme Activity ([facility] #[id number])  HEP B SURFACE ANTIGEN ([facility]#[id number])     Colonoscopy with Dr. Monse Veliz   Colonoscopy/Biopsy/Polypectomy: options, risks, benefits and complications of bleeding, tear, surgical repair (1:1000) & 1:100 post Polypectomy, and reaction of medication, aspiration, Pneumonia explained to patient, 5% chance of missing colon cancer and other lesions explained. All questions answered.      TriLyte With Flavor Packets 420 gram Take as directed     budesonide 3 mg 3 PO daily for 8 weeks     Ulcerative Colitis-Education? ?CBC w/ Diff w/ Platelet ([facility] #[id number])? ?  ? ? Iron and Total Binding Capacity ([facility] #[id number])? ?  ? ? Ferritin ([facility] #[id number])? ?  ? ?QuantiFERON TB [employer]	[facility] [facility] #[id number])? ?  ? ?TPMT Enzyme Activity ([facility] #[id number])? ?  ? ?HEP B SURFACE ANTIGEN ([facility]#[id number])? ?  ? ? Colonoscopy? with Dr. Heriberto Mazariegos ? ? ? Colonoscopy/Biopsy/Polypectomy: options, risks, benefits and complications of bleeding, tear, surgical repair (1:1000) & 1:100 post Polypectomy, and reaction of medication, aspiration, Pneumonia explained to patient, 5% chance of missing colon cancer and other lesions explained. All questions answered. ?   ?  ? ? TriLyte With Flavor Packets? ?420 gram? ?Take as directed? ?   ?  ? ?budesonide? ?3 mg? ?3 PO daily for 8 weeks? ?   ?  ? ? Ulcerative Colitis-Education? Risk & Medical Necessity: The patient requires Moderate to High Severity care for this visit. Diagnosis and management options are Extensive. The amount of data reviewed and/or ordered is Minimal/None. The level of risk is Moderate. Notes: Discussed case with Dr. Feliciano Lin. Karri Torres PA-C     Electronically signed on 2019 10:22:13 AM by Sheri Rivera. Karri Torres, [address] [facility], MRN [id number],  1950 Follow Up, Monday, 2019                                                                                                                                                        New     Modify          Delete     Delete all     Edit Wording          Sign     [email or web address]_Content

## 2020-01-07 NOTE — DISCHARGE INSTRUCTIONS
[address] [facility] D. Marvella Fabry, MD  [phone]      January 7, 2020    Aba Hunt  YOB: 1950    COLONOSCOPY DISCHARGE INSTRUCTIONS    If there is redness at IV site you should apply warm compress to area. If redness or soreness persist contact Dr. Marvella Fabry' or your primary care doctor. There may be a slight amount of blood passed from the rectum. Gaseous discomfort may develop, but walking, belching will help relieve this. You may not operate a vehicle for 12 hours  You may not operate machinery or dangerous appliances for rest of today  You may not drink alcoholic beverages for 12 hours  Avoid making any critical decisions for 24 hours    DIET:  You may resume your normal diet, but some patients find that heavy or large meals may lead to indigestion or vomiting. I suggest a light meal as first food intake. MEDICATIONS:  The use of some over-the-counter pain medication may lead to bleeding after colon biopsies or polyp removal.  Tylenol (also called acetaminophen) is safe to take even if you have had colonoscopy with polyp removal.  Based on the procedure you had today you may not safely take aspirin or aspirin-like products for the next ten (10) days. Remember that Tylenol (also called acetaminophen) is safe to take after colonoscopy even if you have had biopsies or polyps removed. ACTIVITY:  You may resume your normal household activities, but it is recommended that you spend the remainder of the day resting -  avoid any strenuous activity. CALL DR. Jayashree Ruggiero' OFFICE IF:  Increasing pain, nausea, vomiting  Abdominal distension (swelling)  Significant new or increased bleeding (oral or rectal)  Fever/Chills  Chest pain/shortness of breath                       Additional instructions:   Hold aspirin. Continue budesonide and apriso. The colitis is significantly active.   I suggest we have you begin treatment with vedolizumab, also called Entyvio. I will begin the process of authorization and scheduling the infusions. It was an honor to be your doctor today. Please let me or my office staff know if you have any feedback about today's procedure. Kain Lafleur MD    Colonoscopy saves lives, and can prevent colon cancer. Everyone aged 48 or older needs colonoscopy.   Tell your family and friends: get the test!

## 2020-05-20 ENCOUNTER — APPOINTMENT (OUTPATIENT)
Dept: GENERAL RADIOLOGY | Age: 70
End: 2020-05-20
Attending: STUDENT IN AN ORGANIZED HEALTH CARE EDUCATION/TRAINING PROGRAM
Payer: MEDICARE

## 2020-05-20 ENCOUNTER — APPOINTMENT (OUTPATIENT)
Dept: CT IMAGING | Age: 70
End: 2020-05-20
Attending: STUDENT IN AN ORGANIZED HEALTH CARE EDUCATION/TRAINING PROGRAM
Payer: MEDICARE

## 2020-05-20 ENCOUNTER — HOSPITAL ENCOUNTER (EMERGENCY)
Age: 70
Discharge: HOME | End: 2020-05-20
Attending: STUDENT IN AN ORGANIZED HEALTH CARE EDUCATION/TRAINING PROGRAM | Admitting: STUDENT IN AN ORGANIZED HEALTH CARE EDUCATION/TRAINING PROGRAM
Payer: MEDICARE

## 2020-05-20 VITALS
SYSTOLIC BLOOD PRESSURE: 93 MMHG | HEIGHT: 66 IN | RESPIRATION RATE: 18 BRPM | TEMPERATURE: 97.8 F | DIASTOLIC BLOOD PRESSURE: 54 MMHG | OXYGEN SATURATION: 96 % | BODY MASS INDEX: 25.55 KG/M2 | WEIGHT: 159 LBS | HEART RATE: 68 BPM

## 2020-05-20 DIAGNOSIS — R00.2 PALPITATIONS: ICD-10-CM

## 2020-05-20 DIAGNOSIS — D64.9 SYMPTOMATIC ANEMIA: Primary | ICD-10-CM

## 2020-05-20 DIAGNOSIS — K92.2 CHRONIC GI BLEEDING: ICD-10-CM

## 2020-05-20 DIAGNOSIS — R05.9 COUGH: ICD-10-CM

## 2020-05-20 LAB
ALBUMIN SERPL-MCNC: 2.4 G/DL (ref 3.5–5)
ALBUMIN/GLOB SERPL: 0.6 {RATIO} (ref 1.1–2.2)
ALP SERPL-CCNC: 74 U/L (ref 45–117)
ALT SERPL-CCNC: 17 U/L (ref 12–78)
ANION GAP SERPL CALC-SCNC: 4 MMOL/L (ref 5–15)
AST SERPL-CCNC: 7 U/L (ref 15–37)
BASOPHILS # BLD: 0.1 K/UL (ref 0–0.1)
BASOPHILS NFR BLD: 1 % (ref 0–1)
BILIRUB SERPL-MCNC: 0.2 MG/DL (ref 0.2–1)
BUN SERPL-MCNC: 10 MG/DL (ref 6–20)
BUN/CREAT SERPL: 13 (ref 12–20)
CALCIUM SERPL-MCNC: 8.7 MG/DL (ref 8.5–10.1)
CHLORIDE SERPL-SCNC: 103 MMOL/L (ref 97–108)
CO2 SERPL-SCNC: 30 MMOL/L (ref 21–32)
COMMENT, HOLDF: NORMAL
CREAT SERPL-MCNC: 0.76 MG/DL (ref 0.55–1.02)
D DIMER PPP FEU-MCNC: 2.09 MG/L FEU (ref 0–0.65)
DIFFERENTIAL METHOD BLD: ABNORMAL
EOSINOPHIL # BLD: 0.9 K/UL (ref 0–0.4)
EOSINOPHIL NFR BLD: 7 % (ref 0–7)
ERYTHROCYTE [DISTWIDTH] IN BLOOD BY AUTOMATED COUNT: 13.1 % (ref 11.5–14.5)
GLOBULIN SER CALC-MCNC: 4.2 G/DL (ref 2–4)
GLUCOSE SERPL-MCNC: 140 MG/DL (ref 65–100)
HCT VFR BLD AUTO: 30.6 % (ref 35–47)
HGB BLD-MCNC: 9.9 G/DL (ref 11.5–16)
IMM GRANULOCYTES # BLD AUTO: 0.1 K/UL (ref 0–0.04)
IMM GRANULOCYTES NFR BLD AUTO: 0 % (ref 0–0.5)
LYMPHOCYTES # BLD: 1.6 K/UL (ref 0.8–3.5)
LYMPHOCYTES NFR BLD: 13 % (ref 12–49)
MCH RBC QN AUTO: 29.6 PG (ref 26–34)
MCHC RBC AUTO-ENTMCNC: 32.4 G/DL (ref 30–36.5)
MCV RBC AUTO: 91.3 FL (ref 80–99)
MONOCYTES # BLD: 1.1 K/UL (ref 0–1)
MONOCYTES NFR BLD: 9 % (ref 5–13)
NEUTS SEG # BLD: 8.9 K/UL (ref 1.8–8)
NEUTS SEG NFR BLD: 70 % (ref 32–75)
NRBC # BLD: 0 K/UL (ref 0–0.01)
NRBC BLD-RTO: 0 PER 100 WBC
PLATELET # BLD AUTO: 509 K/UL (ref 150–400)
PMV BLD AUTO: 8.3 FL (ref 8.9–12.9)
POTASSIUM SERPL-SCNC: 3.5 MMOL/L (ref 3.5–5.1)
PROT SERPL-MCNC: 6.6 G/DL (ref 6.4–8.2)
RBC # BLD AUTO: 3.35 M/UL (ref 3.8–5.2)
SAMPLES BEING HELD,HOLD: NORMAL
SODIUM SERPL-SCNC: 137 MMOL/L (ref 136–145)
TROPONIN I SERPL-MCNC: <0.05 NG/ML
WBC # BLD AUTO: 12.6 K/UL (ref 3.6–11)

## 2020-05-20 PROCEDURE — 71046 X-RAY EXAM CHEST 2 VIEWS: CPT

## 2020-05-20 PROCEDURE — 85025 COMPLETE CBC W/AUTO DIFF WBC: CPT

## 2020-05-20 PROCEDURE — 99284 EMERGENCY DEPT VISIT MOD MDM: CPT

## 2020-05-20 PROCEDURE — 96360 HYDRATION IV INFUSION INIT: CPT

## 2020-05-20 PROCEDURE — 36415 COLL VENOUS BLD VENIPUNCTURE: CPT

## 2020-05-20 PROCEDURE — 74011636320 HC RX REV CODE- 636/320: Performed by: RADIOLOGY

## 2020-05-20 PROCEDURE — 85379 FIBRIN DEGRADATION QUANT: CPT

## 2020-05-20 PROCEDURE — 71275 CT ANGIOGRAPHY CHEST: CPT

## 2020-05-20 PROCEDURE — 80053 COMPREHEN METABOLIC PANEL: CPT

## 2020-05-20 PROCEDURE — 93005 ELECTROCARDIOGRAM TRACING: CPT

## 2020-05-20 PROCEDURE — 84484 ASSAY OF TROPONIN QUANT: CPT

## 2020-05-20 PROCEDURE — 74011250636 HC RX REV CODE- 250/636: Performed by: STUDENT IN AN ORGANIZED HEALTH CARE EDUCATION/TRAINING PROGRAM

## 2020-05-20 RX ORDER — SODIUM CHLORIDE 9 MG/ML
1000 INJECTION, SOLUTION INTRAVENOUS ONCE
Status: COMPLETED | OUTPATIENT
Start: 2020-05-20 | End: 2020-05-20

## 2020-05-20 RX ADMIN — IOPAMIDOL 100 ML: 755 INJECTION, SOLUTION INTRAVENOUS at 12:37

## 2020-05-20 RX ADMIN — SODIUM CHLORIDE 1000 ML: 900 INJECTION, SOLUTION INTRAVENOUS at 11:26

## 2020-05-20 NOTE — ED TRIAGE NOTES
Pt states she was sent by her PCP to get checked out for continued cough, SOB, diarrhea and weight loss. Pt reports she is also having bright red blood in her stool since august.  Pt has hx of ulcerative colitis. Pt also had negative covid test 5/6.

## 2020-05-20 NOTE — ED PROVIDER NOTES
The patient is a 62-year-old female history of atrial tachycardia, ulcerative colitis and GERD presenting to the emergency department today with multiple complaints. She reports that she has chronic GI bleeding, about 1/4 cup of bright red blood loss per day since August.  She is currently working with her gastroenterologist to get this under control and was started on a biologic medication for it. She reports that over the past 6 weeks she has had progressive cough, shortness of breath, bouts of tachycardia as high as 160. She was tested for coronavirus early in her illness and it was negative. She was trialed on a course of Augmentin with transient improvement in her symptoms. She reports that she has had intermittent low-grade fevers and over the past couple of days she has had worsening fatigue and more frequent episodes of palpitations. Right now she is overall asymptomatic. Denies abdominal pain or fevers today. No vomiting. She has tried to see her primary care doctor but they would not see her because she had a low-grade fever. She tried to talk to her  gastroenterologist but he is out of the office. Past Medical History:   Diagnosis Date    Arrhythmia     a tach     Atrial tachycardia ([address]     Colitis, chronic, ulcerative ([address] 2013    GERD (gastroesophageal reflux disease)     Palpitations     Sleep apnea     has lost weight, better now.   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[facility])        Past Surgical History:   Procedure Laterality Date    BREAST SURGERY PROCEDURE UNLISTED      lumpectomy 1998    COLONOSCOPY N/A 7/24/2018    COLONOSCOPY performed by Luis Smith MD at [facility] COLONOSCOPY N/A 1/7/2020    COLONOSCOPY performed by Jorje Weller MD at [address] [facility]         Family History:   Problem Relation Age of Onset    Other Father         lung and colon cancer    Cancer Father     Cancer Mother     Diabetes Paternal Uncle        Social History     Socioeconomic History    Marital status:      Spouse name: Not on file    Number of children: Not on file    Years of education: Not on file    Highest education level: Not on file   Occupational History    Not on file   Social Needs    Financial resource strain: Not on file    Food insecurity     Worry: Not on file     Inability: Not on file    Transportation needs     Medical: Not on file     Non-medical: Not on file   Tobacco Use    Smoking status: Former Smoker     Last attempt to quit:      Years since quittin.3    Smokeless tobacco: Never Used   Substance and Sexual Activity    Alcohol use: No    Drug use: No    Sexual activity: Not on file   Lifestyle    Physical activity     Days per week: Not on file     Minutes per session: Not on file    Stress: Not on file   Relationships    Social connections     Talks on phone: Not on file     Gets together: Not on file     Attends Baptism service: Not on file     Active member of club or organization: Not on file     Attends meetings of clubs or organizations: Not on file     Relationship status: Not on file    Intimate partner violence     Fear of current or ex partner: Not on file     Emotionally abused: Not on file     Physically abused: Not on file     Forced sexual activity: Not on file   Other Topics Concern    Not on file   Social History Narrative    Not on file         ALLERGIES: Adhesive tape-silicones and Wellbutrin [bupropion]    Review of Systems   Constitutional: Positive for fatigue, fever and unexpected weight change. Negative for chills. HENT: Negative for congestion and rhinorrhea. Eyes: Negative for redness and visual disturbance. Respiratory: Positive for cough and shortness of breath. Cardiovascular: Positive for palpitations. Negative for chest pain and leg swelling. Gastrointestinal: Positive for anal bleeding.  Negative for abdominal pain, diarrhea, nausea and vomiting. Genitourinary: Negative for dysuria, flank pain, frequency, hematuria and urgency. Musculoskeletal: Negative for arthralgias, back pain, myalgias and neck pain. Skin: Negative for rash and wound. Allergic/Immunologic: Negative for immunocompromised state. Neurological: Negative for dizziness and headaches. Vitals:    05/20/20 0947   BP: 125/76   Pulse: 84   Resp: 16   Temp: 97.3 °F (36.3 °C)   SpO2: 99%   Weight: 72.1 kg (159 lb)   Height: 5' 6\" (1.676 m)            Physical Exam  Vitals signs and nursing note reviewed. Constitutional:       General: She is not in acute distress. Appearance: She is well-developed. She is not diaphoretic. HENT:      Head: Normocephalic. Mouth/Throat:      Pharynx: No oropharyngeal exudate. Eyes:      General:         Right eye: No discharge. Left eye: No discharge. Pupils: Pupils are equal, round, and reactive to light. Neck:      Musculoskeletal: Normal range of motion and neck supple. Cardiovascular:      Rate and Rhythm: Normal rate and regular rhythm. Heart sounds: Normal heart sounds. No murmur. No friction rub. No gallop. Pulmonary:      Effort: Pulmonary effort is normal. No respiratory distress. Breath sounds: Normal breath sounds. No stridor. No wheezing or rales. Abdominal:      General: Bowel sounds are normal. There is no distension. Palpations: Abdomen is soft. Tenderness: There is no abdominal tenderness. There is no guarding or rebound. Musculoskeletal: Normal range of motion. General: No deformity. Skin:     General: Skin is warm and dry. Capillary Refill: Capillary refill takes less than 2 seconds. Findings: No rash. Neurological:      Mental Status: She is alert and oriented to person, place, and time.    Psychiatric:         Behavior: Behavior normal.          EKG Interpretation:   ED Physician interpretation  Normal sinus rhythm, rate of 79, normal axis, normal intervals, nonspecific ST-T wave changes. Labs Reviewed:   Slight leukocytosis at 12.6 with an anemia of 9.9  Normal renal function and acceptable electrolytes as well as LFTs  D-dimer elevated  Troponin negative    Imaging Reviewed:   CT PE without evidence of PE or pulmonary infiltrate      MDM:  81-year-old female presenting to the emergency department today with multiple symptoms including cough, shortness of breath and intermittent tachycardia. She does seem to have chronic blood loss anemia from her ulcerative colitis which is been ongoing since August and being managed by her gastroenterologist.  No recent change in the volume of blood she is losing. She is anemic today but shows no hemodynamic instability and with a hemoglobin of 9.9 with no known coronary disease she does not meet indications for emergent blood transfusion. Given her persistent cough, shortness of breath and sensation of tachycardia I did check her for PE and this was negative. I can attribute some of her symptoms to her anemia however I cannot fully explain her cough. Advised that she follow-up with her gastroenterologist as well as cardiologist within the next few days. She was given strict return precautions both verbally and in writing. Clinical Impression:     ICD-10-CM ICD-9-CM    1. Symptomatic anemia D64.9 285.9    2. Chronic GI bleeding K92.2 578.9    3. Palpitations R00.2 785.1    4.  Cough R05 786.2            Disposition: PEGGY Long DO

## 2020-05-21 ENCOUNTER — PATIENT OUTREACH (OUTPATIENT)
Dept: FAMILY MEDICINE CLINIC | Age: 70
End: 2020-05-21

## 2020-05-21 LAB
ATRIAL RATE: 79 BPM
CALCULATED P AXIS, ECG09: -11 DEGREES
CALCULATED R AXIS, ECG10: 27 DEGREES
CALCULATED T AXIS, ECG11: 10 DEGREES
DIAGNOSIS, 93000: NORMAL
P-R INTERVAL, ECG05: 136 MS
Q-T INTERVAL, ECG07: 380 MS
QRS DURATION, ECG06: 82 MS
QTC CALCULATION (BEZET), ECG08: 435 MS
VENTRICULAR RATE, ECG03: 79 BPM

## 2020-05-21 NOTE — PROGRESS NOTES
Patient contacted regarding recent discharge and COVID-19 risk   Care Transition Nurse/ Ambulatory Care Manager contacted the patient by telephone to perform post discharge assessment. Verified name and  with patient as identifiers. Patient has following risk factors of: no known risk factors. CTN/ACM reviewed discharge instructions, medical action plan and red flags related to discharge diagnosis. Reviewed and educated them on any new and changed medications related to discharge diagnosis. Advised obtaining a 90-day supply of all daily and as-needed medications. Education provided regarding infection prevention, and signs and symptoms of COVID-19 and when to seek medical attention with patient who verbalized understanding. Discussed exposure protocols and quarantine from [address] you at higher risk for severe illness  and given an opportunity for questions and concerns. The patient agrees to contact the COVID-19 hotline [phone] or PCP office for questions related to their healthcare. CTN/ACM provided contact information for future reference. From CDC: Are you at higher risk for severe illness?  Wash your hands often.  Avoid close contact (6 feet, which is about two arm lengths) with people who are sick.  Put distance between yourself and other people if COVID-19 is spreading in your community.  Clean and disinfect frequently touched surfaces.  Avoid all cruise travel and non-essential air travel.  Call your healthcare professional if you have concerns about COVID-19 and your underlying condition or if you are sick.     For more information on steps you can take to protect yourself, see CDC's How to Protect Yourself      Patient/family/caregiver given information for Jeff Gallegos and agrees to enroll no  Patient's preferred e-mail:  n/a  Patient's preferred phone number: n/a  Based on Loop alert triggers, patient will be contacted by nurse care manager for worsening symptoms. Plan for follow-up call in 7-14 days based on severity of symptoms and risk factors.

## 2020-07-05 ENCOUNTER — HOSPITAL ENCOUNTER (INPATIENT)
Age: 70
LOS: 1 days | Discharge: HOME | DRG: 386 | End: 2020-07-06
Attending: STUDENT IN AN ORGANIZED HEALTH CARE EDUCATION/TRAINING PROGRAM | Admitting: INTERNAL MEDICINE
Payer: MEDICARE

## 2020-07-05 ENCOUNTER — APPOINTMENT (OUTPATIENT)
Dept: GENERAL RADIOLOGY | Age: 70
DRG: 386 | End: 2020-07-05
Attending: STUDENT IN AN ORGANIZED HEALTH CARE EDUCATION/TRAINING PROGRAM
Payer: MEDICARE

## 2020-07-05 DIAGNOSIS — D64.9 SYMPTOMATIC ANEMIA: ICD-10-CM

## 2020-07-05 DIAGNOSIS — R00.0 TACHYCARDIA: ICD-10-CM

## 2020-07-05 DIAGNOSIS — K51.911 ULCERATIVE COLITIS WITH RECTAL BLEEDING, UNSPECIFIED LOCATION (HCC): Primary | ICD-10-CM

## 2020-07-05 DIAGNOSIS — R06.02 SOB (SHORTNESS OF BREATH): ICD-10-CM

## 2020-07-05 DIAGNOSIS — R42 LIGHTHEADEDNESS: ICD-10-CM

## 2020-07-05 PROBLEM — K51.90 ULCERATIVE COLITIS (HCC): Status: ACTIVE | Noted: 2020-07-05

## 2020-07-05 PROBLEM — K21.9 GERD (GASTROESOPHAGEAL REFLUX DISEASE): Status: ACTIVE | Noted: 2020-07-05

## 2020-07-05 PROBLEM — R73.9 HYPERGLYCEMIA: Status: ACTIVE | Noted: 2020-07-05

## 2020-07-05 PROBLEM — D72.829 LEUKOCYTOSIS: Status: ACTIVE | Noted: 2020-07-05

## 2020-07-05 LAB
ALBUMIN SERPL-MCNC: 2.2 G/DL (ref 3.5–5)
ALBUMIN/GLOB SERPL: 0.5 {RATIO} (ref 1.1–2.2)
ALP SERPL-CCNC: 89 U/L (ref 45–117)
ALT SERPL-CCNC: 15 U/L (ref 12–78)
ANION GAP SERPL CALC-SCNC: 4 MMOL/L (ref 5–15)
APPEARANCE UR: ABNORMAL
AST SERPL-CCNC: 10 U/L (ref 15–37)
ATRIAL RATE: 101 BPM
BACTERIA URNS QL MICRO: NEGATIVE /HPF
BASOPHILS # BLD: 0.1 K/UL (ref 0–0.1)
BASOPHILS NFR BLD: 1 % (ref 0–1)
BILIRUB SERPL-MCNC: 0.4 MG/DL (ref 0.2–1)
BILIRUB UR QL: NEGATIVE
BNP SERPL-MCNC: 394 PG/ML
BUN SERPL-MCNC: 12 MG/DL (ref 6–20)
BUN/CREAT SERPL: 15 (ref 12–20)
CALCIUM SERPL-MCNC: 8.3 MG/DL (ref 8.5–10.1)
CALCULATED P AXIS, ECG09: 48 DEGREES
CALCULATED R AXIS, ECG10: 63 DEGREES
CALCULATED T AXIS, ECG11: 50 DEGREES
CAOX CRY URNS QL MICRO: ABNORMAL
CHLORIDE SERPL-SCNC: 103 MMOL/L (ref 97–108)
CO2 SERPL-SCNC: 30 MMOL/L (ref 21–32)
COLOR UR: ABNORMAL
COMMENT, HOLDF: NORMAL
CREAT SERPL-MCNC: 0.79 MG/DL (ref 0.55–1.02)
DIAGNOSIS, 93000: NORMAL
DIFFERENTIAL METHOD BLD: ABNORMAL
EOSINOPHIL # BLD: 0.4 K/UL (ref 0–0.4)
EOSINOPHIL NFR BLD: 3 % (ref 0–7)
EPITH CASTS URNS QL MICRO: ABNORMAL /LPF
ERYTHROCYTE [DISTWIDTH] IN BLOOD BY AUTOMATED COUNT: 15 % (ref 11.5–14.5)
EST. AVERAGE GLUCOSE BLD GHB EST-MCNC: 131 MG/DL
GLOBULIN SER CALC-MCNC: 4.5 G/DL (ref 2–4)
GLUCOSE SERPL-MCNC: 192 MG/DL (ref 65–100)
GLUCOSE UR STRIP.AUTO-MCNC: NEGATIVE MG/DL
HBA1C MFR BLD: 6.2 % (ref 4–5.6)
HCT VFR BLD AUTO: 24.8 % (ref 35–47)
HCT VFR BLD AUTO: 26.7 % (ref 35–47)
HGB BLD-MCNC: 7.5 G/DL (ref 11.5–16)
HGB BLD-MCNC: 8 G/DL (ref 11.5–16)
HGB UR QL STRIP: NEGATIVE
IMM GRANULOCYTES # BLD AUTO: 0 K/UL (ref 0–0.04)
IMM GRANULOCYTES NFR BLD AUTO: 0 % (ref 0–0.5)
KETONES UR QL STRIP.AUTO: ABNORMAL MG/DL
LEUKOCYTE ESTERASE UR QL STRIP.AUTO: NEGATIVE
LYMPHOCYTES # BLD: 1.3 K/UL (ref 0.8–3.5)
LYMPHOCYTES NFR BLD: 9 % (ref 12–49)
MCH RBC QN AUTO: 26.7 PG (ref 26–34)
MCHC RBC AUTO-ENTMCNC: 30 G/DL (ref 30–36.5)
MCV RBC AUTO: 89 FL (ref 80–99)
MONOCYTES # BLD: 1 K/UL (ref 0–1)
MONOCYTES NFR BLD: 7 % (ref 5–13)
NEUTS SEG # BLD: 11.2 K/UL (ref 1.8–8)
NEUTS SEG NFR BLD: 80 % (ref 32–75)
NITRITE UR QL STRIP.AUTO: NEGATIVE
NRBC # BLD: 0 K/UL (ref 0–0.01)
NRBC BLD-RTO: 0 PER 100 WBC
P-R INTERVAL, ECG05: 124 MS
PH UR STRIP: 5.5 [PH] (ref 5–8)
PLATELET # BLD AUTO: 604 K/UL (ref 150–400)
PMV BLD AUTO: 8 FL (ref 8.9–12.9)
POTASSIUM SERPL-SCNC: 3.6 MMOL/L (ref 3.5–5.1)
PROT SERPL-MCNC: 6.7 G/DL (ref 6.4–8.2)
PROT UR STRIP-MCNC: NEGATIVE MG/DL
Q-T INTERVAL, ECG07: 334 MS
QRS DURATION, ECG06: 74 MS
QTC CALCULATION (BEZET), ECG08: 433 MS
RBC # BLD AUTO: 3 M/UL (ref 3.8–5.2)
RBC #/AREA URNS HPF: ABNORMAL /HPF (ref 0–5)
RBC MORPH BLD: ABNORMAL
SAMPLES BEING HELD,HOLD: NORMAL
SODIUM SERPL-SCNC: 137 MMOL/L (ref 136–145)
SP GR UR REFRACTOMETRY: 1.03 (ref 1–1.03)
TROPONIN I SERPL-MCNC: <0.05 NG/ML
UR CULT HOLD, URHOLD: NORMAL
UROBILINOGEN UR QL STRIP.AUTO: 0.2 EU/DL (ref 0.2–1)
VENTRICULAR RATE, ECG03: 101 BPM
WBC # BLD AUTO: 14 K/UL (ref 3.6–11)
WBC MORPH BLD: ABNORMAL
WBC URNS QL MICRO: ABNORMAL /HPF (ref 0–4)

## 2020-07-05 PROCEDURE — 71046 X-RAY EXAM CHEST 2 VIEWS: CPT

## 2020-07-05 PROCEDURE — 99285 EMERGENCY DEPT VISIT HI MDM: CPT

## 2020-07-05 PROCEDURE — 93005 ELECTROCARDIOGRAM TRACING: CPT

## 2020-07-05 PROCEDURE — 89055 LEUKOCYTE ASSESSMENT FECAL: CPT

## 2020-07-05 PROCEDURE — 80053 COMPREHEN METABOLIC PANEL: CPT

## 2020-07-05 PROCEDURE — 74011250636 HC RX REV CODE- 250/636: Performed by: INTERNAL MEDICINE

## 2020-07-05 PROCEDURE — 74011250637 HC RX REV CODE- 250/637: Performed by: INTERNAL MEDICINE

## 2020-07-05 PROCEDURE — 74011250636 HC RX REV CODE- 250/636: Performed by: PHYSICIAN ASSISTANT

## 2020-07-05 PROCEDURE — P9016 RBC LEUKOCYTES REDUCED: HCPCS

## 2020-07-05 PROCEDURE — 96374 THER/PROPH/DIAG INJ IV PUSH: CPT

## 2020-07-05 PROCEDURE — 83880 ASSAY OF NATRIURETIC PEPTIDE: CPT

## 2020-07-05 PROCEDURE — 85018 HEMOGLOBIN: CPT

## 2020-07-05 PROCEDURE — 96361 HYDRATE IV INFUSION ADD-ON: CPT

## 2020-07-05 PROCEDURE — 85025 COMPLETE CBC W/AUTO DIFF WBC: CPT

## 2020-07-05 PROCEDURE — 81001 URINALYSIS AUTO W/SCOPE: CPT

## 2020-07-05 PROCEDURE — 87493 C DIFF AMPLIFIED PROBE: CPT

## 2020-07-05 PROCEDURE — 86900 BLOOD TYPING SEROLOGIC ABO: CPT

## 2020-07-05 PROCEDURE — 36415 COLL VENOUS BLD VENIPUNCTURE: CPT

## 2020-07-05 PROCEDURE — 87506 IADNA-DNA/RNA PROBE TQ 6-11: CPT

## 2020-07-05 PROCEDURE — 86923 COMPATIBILITY TEST ELECTRIC: CPT

## 2020-07-05 PROCEDURE — 36430 TRANSFUSION BLD/BLD COMPNT: CPT

## 2020-07-05 PROCEDURE — 84484 ASSAY OF TROPONIN QUANT: CPT

## 2020-07-05 PROCEDURE — 83036 HEMOGLOBIN GLYCOSYLATED A1C: CPT

## 2020-07-05 PROCEDURE — 30233N1 TRANSFUSION OF NONAUTOLOGOUS RED BLOOD CELLS INTO PERIPHERAL VEIN, PERCUTANEOUS APPROACH: ICD-10-PCS | Performed by: INTERNAL MEDICINE

## 2020-07-05 PROCEDURE — 65660000000 HC RM CCU STEPDOWN

## 2020-07-05 PROCEDURE — 0107U C DIFF TOX AG DETCJ IA STOOL: CPT

## 2020-07-05 PROCEDURE — 85014 HEMATOCRIT: CPT

## 2020-07-05 RX ORDER — SODIUM CHLORIDE 0.9 % (FLUSH) 0.9 %
5-40 SYRINGE (ML) INJECTION AS NEEDED
Status: DISCONTINUED | OUTPATIENT
Start: 2020-07-05 | End: 2020-07-06 | Stop reason: HOSPADM

## 2020-07-05 RX ORDER — SODIUM CHLORIDE 9 MG/ML
75 INJECTION, SOLUTION INTRAVENOUS CONTINUOUS
Status: DISCONTINUED | OUTPATIENT
Start: 2020-07-05 | End: 2020-07-06

## 2020-07-05 RX ORDER — ZOLPIDEM TARTRATE 5 MG/1
5 TABLET ORAL
Status: DISCONTINUED | OUTPATIENT
Start: 2020-07-05 | End: 2020-07-06 | Stop reason: HOSPADM

## 2020-07-05 RX ORDER — ATORVASTATIN CALCIUM 20 MG/1
20 TABLET, FILM COATED ORAL
Status: DISCONTINUED | OUTPATIENT
Start: 2020-07-05 | End: 2020-07-06 | Stop reason: HOSPADM

## 2020-07-05 RX ORDER — SODIUM CHLORIDE 0.9 % (FLUSH) 0.9 %
5-40 SYRINGE (ML) INJECTION EVERY 8 HOURS
Status: DISCONTINUED | OUTPATIENT
Start: 2020-07-05 | End: 2020-07-06 | Stop reason: HOSPADM

## 2020-07-05 RX ADMIN — METHYLPREDNISOLONE SODIUM SUCCINATE 30 MG: 40 INJECTION, POWDER, FOR SOLUTION INTRAMUSCULAR; INTRAVENOUS at 12:25

## 2020-07-05 RX ADMIN — ATORVASTATIN CALCIUM 20 MG: 20 TABLET, FILM COATED ORAL at 21:15

## 2020-07-05 RX ADMIN — SODIUM CHLORIDE 1000 ML: 900 INJECTION, SOLUTION INTRAVENOUS at 10:19

## 2020-07-05 RX ADMIN — SODIUM CHLORIDE 1000 ML: 900 INJECTION, SOLUTION INTRAVENOUS at 13:14

## 2020-07-05 RX ADMIN — ZOLPIDEM TARTRATE 5 MG: 5 TABLET ORAL at 21:15

## 2020-07-05 RX ADMIN — SODIUM CHLORIDE 75 ML/HR: 900 INJECTION, SOLUTION INTRAVENOUS at 20:38

## 2020-07-05 RX ADMIN — Medication 10 ML: at 20:38

## 2020-07-05 RX ADMIN — METHYLPREDNISOLONE SODIUM SUCCINATE 30 MG: 40 INJECTION, POWDER, FOR SOLUTION INTRAMUSCULAR; INTRAVENOUS at 20:38

## 2020-07-05 NOTE — CONSULTS
[facility] Anaya. Perez Levine, [address] [facility]  [phone] office     Gastroenterology Consultation Note      Admit Date: 7/5/2020  Consult Date: 7/5/2020       Narrative Assessment and Plan   · 78 yo female with L sided UC admitted for presumed UC flare. Not improving with entyvio. Had been doing better on higher dose of prednisone now only taking 5 mg daily. Uceris did not help. Plan was to transition to humira. With anemia. Originally today had planned to discharge but is seemingly now admitted. Entyvio was loaded but she had low level, could consider repeat loading outpatient. Recommendations:  - stool cx and c diff  - methypred 30 q 12 transition to oral steroids, will need likely to use prednisone as bridge to biologic effeciveness  - avoid NSAIDs  - transfuse as needed    Subjective:     Chief Complaint: bloody D, anemia, UC    History of Present Illness:68 yo female with L sided UC admitted for presumed UC flare. Not improving with entyvio. Had been doing better on higher dose of prednisone now only taking 5 mg daily. Plan was to transition to humira. With anemia. Entyvio was loaded but she had low level without Ab. Colonoscopy Jan 2020 with severe changes, no dysplasia, no CMV noted. PCP:  Zeb Barros DO    Past Medical History:   Diagnosis Date    Arrhythmia     a tach     Atrial tachycardia ([address]     Colitis, chronic, ulcerative ([address] 2013    GERD (gastroesophageal reflux disease)     Palpitations     Sleep apnea     has lost weight, better now.   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[facility])         Past Surgical History:   Procedure Laterality Date    BREAST SURGERY PROCEDURE UNLISTED      lumpectomy 1998    COLONOSCOPY N/A 7/24/2018    COLONOSCOPY performed by Anita Alegria MD at [address] [facility] COLONOSCOPY N/A 1/7/2020    COLONOSCOPY performed by Ike Zavala MD at [facility]       Social History     Tobacco Use    Smoking status: Former Smoker     Last attempt to quit:      Years since quittin.5    Smokeless tobacco: Never Used   Substance Use Topics    Alcohol use: No        Family History   Problem Relation Age of Onset    Other Father         lung and colon cancer    Cancer Father     Cancer Mother     Diabetes Paternal Uncle         Allergies   Allergen Reactions    Adhesive Tape-Silicones Contact Dermatitis    Wellbutrin [Bupropion] Rash            Home Medications:  Prior to Admission Medications   Prescriptions Last Dose Informant Patient Reported? Taking?   acyclovir (ZOVIRAX) 400 mg tablet   Yes No   Sig: Take 400 mg by mouth two (2) times a day. aspirin 81 mg chewable tablet   Yes No   Sig: Take 81 mg by mouth every Monday, Wednesday, Friday. atenolol (TENORMIN) 25 mg tablet   Yes No   Sig: Take 25 mg by mouth daily. atorvastatin (LIPITOR) 20 mg tablet   Yes No   Sig: Take 20 mg by mouth daily. budesonide (ENTOCORT EC) 3 mg capsule   Yes No   Sig: Take 9 mg by mouth every morning. calcium carbonate (OS-VICENTA) 500 mg calcium (1,250 mg) tablet   Yes No   Sig: Take  by mouth daily. citalopram (CELEXA) 20 mg tablet   Yes No   Sig: Take 20 mg by mouth daily. cyclobenzaprine (FLEXERIL) 5 mg tablet   Yes No   Sig: Take 5 mg by mouth as needed for Muscle Spasm(s). mesalamine (CANASA) 1,000 mg suppository   Yes No   Sig: Insert  into rectum two (2) times a day. mesalamine ER (APRISO) 0.375 gram capsule   Yes No   Sig: Take 1.5 g by mouth daily. mineral oil (FLEET) enema   Yes No   Sig: Insert  into rectum nightly. Mesalamine enema with flare ups only. turmeric root extract 500 mg cap   Yes No   Sig: Take 1 Cap by mouth daily. zolpidem (AMBIEN) 10 mg tablet   Yes No   Sig: Take 5 mg by mouth nightly as needed for Sleep.       Facility-Administered Medications: None       Hospital Medications:  Current Facility-Administered Medications   Medication Dose Route Frequency    sodium chloride (NS) flush 5-40 mL  5-40 mL IntraVENous Q8H    sodium chloride (NS) flush 5-40 mL  5-40 mL IntraVENous PRN    0.9% sodium chloride infusion  75 mL/hr IntraVENous CONTINUOUS    methylPREDNISolone (PF) (SOLU-MEDROL) injection 30 mg  30 mg IntraVENous Q12H       Review of Systems: Admission ROS by Angel Tran MD from 7/5/2020 were reviewed with the patient and changes (other than per HPI) include[de-identified] negative except per HPI      Objective:     Physical Exam:  Visit Vitals  /67 (BP 1 Location: Left arm, BP Patient Position: At rest)   Pulse 82   Temp 98.8 °F (37.1 °C)   Resp 19   Ht 5' 6\" (1.676 m)   Wt 69.9 kg (154 lb)   SpO2 95%   BMI 24.86 kg/m²     SpO2 Readings from Last 6 Encounters:   07/05/20 95%   05/20/20 96%   01/07/20 100%   07/24/18 100%   11/16/16 97%   07/18/13 98%            Intake/Output Summary (Last 24 hours) at 7/5/2020 1923  Last data filed at 7/5/2020 1417  Gross per 24 hour   Intake 310 ml   Output    Net 310 ml      General: no distress, comfortable  Skin:  No visible rash or palpable dermatologic mass lesions  HEENT: Pupils equal, sclera anicteric  Respiratory:  No abnormal audible breath sounds, normal respiratory effort, no throacic deformity  GI:  Abdomen nondistended, mild lower abd ttp, no mass, no free fluid, no rebound or guarding. Musculoskeletal:  No gross skeletal deformity.   Neurological:  Alert and oriented  Psychiatric:  Normal affect, memory intact,    Laboratory:    Recent Results (from the past 24 hour(s))   EKG, 12 LEAD, INITIAL    Collection Time: 07/05/20  8:42 AM   Result Value Ref Range    Ventricular Rate 101 BPM    Atrial Rate 101 BPM    P-R Interval 124 ms    QRS Duration 74 ms    Q-T Interval 334 ms    QTC Calculation (Bezet) 433 ms    Calculated P Axis 48 degrees    Calculated R Axis 63 degrees    Calculated T Axis 50 degrees    Diagnosis       Sinus tachycardia  Otherwise normal ECG  When compared with ECG of 20-MAY-2020 11:23,  No significant change was found  Confirmed by Jesse Mcwilliams MD.Inder ([id number]) on 7/5/2020 6:20:46 PM     SAMPLES BEING HELD    Collection Time: 07/05/20  8:47 AM   Result Value Ref Range    SAMPLES BEING HELD 1BL,1RD,1SST     COMMENT        Add-on orders for these samples will be processed based on acceptable specimen integrity and analyte stability, which may vary by analyte. METABOLIC PANEL, COMPREHENSIVE    Collection Time: 07/05/20  8:47 AM   Result Value Ref Range    Sodium 137 136 - 145 mmol/L    Potassium 3.6 3.5 - 5.1 mmol/L    Chloride 103 97 - 108 mmol/L    CO2 30 21 - 32 mmol/L    Anion gap 4 (L) 5 - 15 mmol/L    Glucose 192 (H) 65 - 100 mg/dL    BUN 12 6 - 20 MG/DL    Creatinine 0.79 0.55 - 1.02 MG/DL    BUN/Creatinine ratio 15 12 - 20      GFR est AA >60 >60 ml/min/1.73m2    GFR est non-AA >60 >60 ml/min/1.73m2    Calcium 8.3 (L) 8.5 - 10.1 MG/DL    Bilirubin, total 0.4 0.2 - 1.0 MG/DL    ALT (SGPT) 15 12 - 78 U/L    AST (SGOT) 10 (L) 15 - 37 U/L    Alk. phosphatase 89 45 - 117 U/L    Protein, total 6.7 6.4 - 8.2 g/dL    Albumin 2.2 (L) 3.5 - 5.0 g/dL    Globulin 4.5 (H) 2.0 - 4.0 g/dL    A-G Ratio 0.5 (L) 1.1 - 2.2     TROPONIN I    Collection Time: 07/05/20  8:47 AM   Result Value Ref Range    Troponin-I, Qt. <0.05 <0.05 ng/mL   CBC WITH AUTOMATED DIFF    Collection Time: 07/05/20  8:47 AM   Result Value Ref Range    WBC 14.0 (H) 3.6 - 11.0 K/uL    RBC 3.00 (L) 3.80 - 5.20 M/uL    HGB 8.0 (L) 11.5 - 16.0 g/dL    HCT 26.7 (L) 35.0 - 47.0 %    MCV 89.0 80.0 - 99.0 FL    MCH 26.7 26.0 - 34.0 PG    MCHC 30.0 30.0 - 36.5 g/dL    RDW 15.0 (H) 11.5 - 14.5 %    PLATELET 836 (H) 863 - 400 K/uL    MPV 8.0 (L) 8.9 - 12.9 FL    NRBC 0.0 0  WBC    ABSOLUTE NRBC 0.00 0.00 - 0.01 K/uL    NEUTROPHILS 80 (H) 32 - 75 %    LYMPHOCYTES 9 (L) 12 - 49 %    MONOCYTES 7 5 - 13 %    EOSINOPHILS 3 0 - 7 %    BASOPHILS 1 0 - 1 %    IMMATURE GRANULOCYTES 0 0.0 - 0.5 %    ABS. NEUTROPHILS 11.2 (H) 1.8 - 8.0 K/UL    ABS.  LYMPHOCYTES 1.3 0.8 - 3.5 K/UL ABS. MONOCYTES 1.0 0.0 - 1.0 K/UL    ABS. EOSINOPHILS 0.4 0.0 - 0.4 K/UL    ABS. BASOPHILS 0.1 0.0 - 0.1 K/UL    ABS. IMM. GRANS. 0.0 0.00 - 0.04 K/UL    DF SMEAR SCANNED      RBC COMMENTS NORMOCYTIC, NORMOCHROMIC      WBC COMMENTS TOXIC GRANULATION     NT-PRO BNP    Collection Time: 07/05/20  8:47 AM   Result Value Ref Range    NT pro- (H) <125 PG/ML   TYPE & SCREEN    Collection Time: 07/05/20 10:25 AM   Result Value Ref Range    Crossmatch Expiration 07/08/2020     ABO/Rh(D) Suliaman Robin NEGATIVE     Antibody screen NEG     Unit number [id number]     Blood component type RC LR     Unit division 00     Status of unit ISSUED     Crossmatch result Compatible    URINALYSIS W/MICROSCOPIC    Collection Time: 07/05/20 12:09 PM   Result Value Ref Range    Color DARK YELLOW      Appearance CLOUDY (A) CLEAR      Specific gravity 1.030 1.003 - 1.030      pH (UA) 5.5 5.0 - 8.0      Protein Negative NEG mg/dL    Glucose Negative NEG mg/dL    Ketone TRACE (A) NEG mg/dL    Bilirubin Negative NEG      Blood Negative NEG      Urobilinogen 0.2 0.2 - 1.0 EU/dL    Nitrites Negative NEG      Leukocyte Esterase Negative NEG      WBC 0-4 0 - 4 /hpf    RBC 0-5 0 - 5 /hpf    Epithelial cells FEW FEW /lpf    Bacteria Negative NEG /hpf    CA Oxalate crystals 3+ (A) NEG   URINE CULTURE HOLD SAMPLE    Collection Time: 07/05/20 12:09 PM   Result Value Ref Range    Urine culture hold        Urine on hold in Microbiology dept for 2 days. If unpreserved urine is submitted, it cannot be used for addtional testing after 24 hours, recollection will be required.    HEMOGLOBIN    Collection Time: 07/05/20  1:17 PM   Result Value Ref Range    HGB 7.5 (L) 11.5 - 16.0 g/dL   HEMATOCRIT    Collection Time: 07/05/20  1:17 PM   Result Value Ref Range    HCT 24.8 (L) 35.0 - 47.0 %         Assessment/Plan:     Principal Problem:    GI bleed (7/17/2013)    Active Problems:    Depression (7/17/2013)      Ulcerative colitis ([facility] (7/5/2020)      Anemia (7/5/2020)      GERD (gastroesophageal reflux disease) (7/5/2020)      Leukocytosis (7/5/2020)      Hyperglycemia (7/5/2020)         See above narrative for full detail.

## 2020-07-05 NOTE — ED TRIAGE NOTES
Pt reports SOB with rapid HR that has been ongoing since 5/25. Pt reports hx of anemia. Pt also reports \"fevers of 99. \"

## 2020-07-05 NOTE — H&P
[facility]  [address] [facility] 19  [phone]    Admission History and Physical      NAME:  Daleen Nyhan   :   1950   MRN:  [id number]     PCP:  Joanie Hernández DO     Date of service:  2020         Subjective:     CHIEF COMPLAINT: Dizziness, diarrhea, hematochezia, shortness of breath, fatigue    HISTORY OF PRESENT ILLNESS:     Ms. Patrizia Grissom is a 77 y.o.  female who is admitted with hematochezia. Ms. Patrizia Grissom with past medical history of ulcerative colitis, palpitation, GERD presented to ER complaining of palpitation, fatigue, shortness of breath, headache, bloody diarrhea, which is chronic but last couple of days that has been worse and frequent. Has been having chronic diarrhea, but recently became bloody. She felt weak and started to have shortness of breath with minimal exertion. Denies chest pain, abdominal pain, nausea, vomiting. Denies fever. Early morning patient noted palpitation and when she checked her pulse it was in 160s    Past Medical History:   Diagnosis Date    Arrhythmia     a tach     Atrial tachycardia ([address]     Colitis, chronic, ulcerative ([address] 2013    GERD (gastroesophageal reflux disease)     Palpitations     Sleep apnea     has lost weight, better now.   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[facility])         Past Surgical History:   Procedure Laterality Date    BREAST SURGERY PROCEDURE UNLISTED      lumpectomy     COLONOSCOPY N/A 2018    COLONOSCOPY performed by Justen Prather MD at [facility] 112 COLONOSCOPY N/A 2020    COLONOSCOPY performed by Jaun Phoenix, MD at [address] [place] [address]       Social History     Tobacco Use    Smoking status: Former Smoker     Last attempt to quit: 2003     Years since quittin.5    Smokeless tobacco: Never Used   Substance Use Topics    Alcohol use: No        Family History   Problem Relation Age of Onset    Other Father lung and colon cancer    Cancer Father     Cancer Mother     Diabetes Paternal Uncle         Allergies   Allergen Reactions    Adhesive Tape-Silicones Contact Dermatitis    Wellbutrin [Bupropion] Rash        Prior to Admission medications    Medication Sig Start Date End Date Taking? Authorizing Provider   budesonide (ENTOCORT EC) 3 mg capsule Take 9 mg by mouth every morning. Provider, Historical   mesalamine (CANASA) 1,000 mg suppository Insert  into rectum two (2) times a day. Provider, Historical   mineral oil (FLEET) enema Insert  into rectum nightly. Mesalamine enema with flare ups only. Provider, Historical   turmeric root extract 500 mg cap Take 1 Cap by mouth daily. Provider, Historical   atorvastatin (LIPITOR) 20 mg tablet Take 20 mg by mouth daily. Provider, Historical   mesalamine ER (APRISO) 0.375 gram capsule Take 1.5 g by mouth daily. Provider, Historical   calcium carbonate (OS-VICENTA) 500 mg calcium (1,250 mg) tablet Take  by mouth daily. Provider, Historical   cyclobenzaprine (FLEXERIL) 5 mg tablet Take 5 mg by mouth as needed for Muscle Spasm(s). Provider, Historical   zolpidem (AMBIEN) 10 mg tablet Take 5 mg by mouth nightly as needed for Sleep. Provider, Historical   aspirin 81 mg chewable tablet Take 81 mg by mouth every Monday, Wednesday, Friday. Provider, Historical   atenolol (TENORMIN) 25 mg tablet Take 25 mg by mouth daily. Provider, Historical   acyclovir (ZOVIRAX) 400 mg tablet Take 400 mg by mouth two (2) times a day. 11/30/10   Provider, Historical   citalopram (CELEXA) 20 mg tablet Take 20 mg by mouth daily.  11/30/10   Provider, Historical         Review of Systems:  (bold if positive, if negative)    Gen:  Eyes:  ENT:  CVS:  Pulm:  GI:   hematocheziaGU:  MS:  Skin:  Psych:  Endo:  Hem:  Renal:  Neuro:            Objective:      VITALS:    Vital signs reviewed; most recent are:    Visit Vitals  /68 (BP 1 Location: Left arm, BP Patient Position: At rest)   Pulse 98   Temp 98.4 °F (36.9 °C)   Resp 15   Ht 5' 6\" (1.676 m)   Wt 69.9 kg (154 lb)   SpO2 100%   BMI 24.86 kg/m²     SpO2 Readings from Last 6 Encounters:   07/05/20 100%   05/20/20 96%   01/07/20 100%   07/24/18 100%   11/16/16 97%   07/18/13 98%            Intake/Output Summary (Last 24 hours) at 7/5/2020 1452  Last data filed at 7/5/2020 1417  Gross per 24 hour   Intake 310 ml   Output    Net 310 ml            Exam:     Physical Exam:    Gen:  Well-developed, well-nourished, in no acute distress  HEENT:  Pink conjunctivae, PERRL, hearing intact to voice, moist mucous membranes  Neck:  Supple, without masses, thyroid non-tender  Resp:  No accessory muscle use, clear breath sounds without wheezes rales or rhonchi  Card:  No murmurs, normal S1, S2 without thrills, bruits or peripheral edema  Abd:  Soft, non-tender, non-distended, normoactive bowel sounds are present, no palpable organomegaly and no detectable hernias  Lymph:  No cervical or inguinal adenopathy  Musc:  No cyanosis or clubbing  Skin:  No rashes or ulcers, skin turgor is good  Neuro:  Cranial nerves are grossly intact, no focal motor weakness, follows commands appropriately  Psych:  Good insight, oriented to person, place and time, alert       Labs:    Recent Labs     07/05/20  1317 07/05/20  0847   WBC  --  14.0*   HGB 7.5* 8.0*   HCT 24.8* 26.7*   PLT  --  604*     Recent Labs     07/05/20  0847      K 3.6      CO2 30   *   BUN 12   CREA 0.79   CA 8.3*   ALB 2.2*   TBILI 0.4   ALT 15     Lab Results   Component Value Date/Time    Glucose (POC) 109 (H) 07/17/2013 02:36 PM     No results for input(s): PH, PCO2, PO2, HCO3, FIO2 in the last 72 hours. No results for input(s): INR, INREXT in the last 72 hours. Telemetry reviewed:   normal sinus rhythm       Assessment/Plan:    1. Hematochezia/diarrhea/history of ulcerative colitis. Admit to telemetry. Check stool for C. difficile, WBC, enteric bacteria. Keep n.p.o. after midnight. Had colonoscopy by Dr. Anabella Epperson in January of this year, which was unremarkable. Continue IV steroids. Continue IV fluids and monitor clinically. 2.  Acute on chronic anemia secondary to GI bleed due to ulcerative colitis. Getting blood transfusion. Continue to monitor H&H and transfuse as needed     3. GERD (gastroesophageal reflux disease) (7/5/2020). Continue PPI     4. Depression (7/17/2013). Continue home medication    5. Leukocytosis (7/5/2020). Doubt infection. Most likely secondary to steroids. Her UA and chest x-ray are unremarkable. 6.  Hyperglycemia (7/5/2020). Likely due to steroid patient. Denies diabetes mellitus. Check A1c(although only note helpful in the setting of anemia).          Previous medical records reviewed     Risk of deterioration: high      Total time spent with patient: [address] discussed with: Patient, Nursing Staff and >50% of time spent in counseling and coordination of care    Discussed:  Care Plan    Prophylaxis:  SCD's    Probable Disposition:  Home w/Family           ___________________________________________________    Attending Physician: Cain Beasley MD

## 2020-07-05 NOTE — ED NOTES
TRANSFER - OUT REPORT:    Verbal report given to [address] RN (name) on Corby Hoff  being transferred to [facility] (unit) for routine progression of care       Report consisted of patients Situation, Background, Assessment and   Recommendations(SBAR). Information from the following report(s) SBAR, Kardex, ED Summary, [facility] and Recent Results was reviewed with the receiving nurse. Lines:   Peripheral IV 07/05/20 Right Antecubital (Active)   Site Assessment Clean, dry, & intact 7/5/2020  8:49 AM   Phlebitis Assessment 0 7/5/2020  8:49 AM   Infiltration Assessment 0 7/5/2020  8:49 AM   Dressing Status Clean, dry, & intact 7/5/2020  8:49 AM   Hub Color/Line Status Pink 7/5/2020  8:49 AM       Peripheral IV 07/05/20 Left Antecubital (Active)   Site Assessment Clean, dry, & intact 7/5/2020  1:20 PM   Phlebitis Assessment 0 7/5/2020  1:20 PM   Infiltration Assessment 0 7/5/2020  1:20 PM   Dressing Status Clean, dry, & intact 7/5/2020  1:20 PM   Dressing Type Transparent 7/5/2020  1:20 PM   Hub Color/Line Status Pink 7/5/2020  1:20 PM        Opportunity for questions and clarification was provided.       Patient transported with:   Monitor  Registered Nurse

## 2020-07-05 NOTE — ED PROVIDER NOTES
Lacie Wolfe is a 55-year-old female presenting today with a history of atrial tachycardia, GERD, ulcerative colitis. Comes in today with multiple complaints including worsening shortness of breath, lightheadedness, headaches, rapid heartbeat, and painless rectal bleeding. No abdominal pain, bowel movements are loose, soft which is her baseline. Has a history of ulcerative colitis states she bleeds anywhere from 1/4 cup to 1/3 cup of dark red blood per day. Currently followed by Dr. Jr Guy GI, she is currently on mesalamine, prednisone, and vedolizumab. Last seen by virtual visit by Dr. Hemant Jarvis in early June, was started on 3 iron pills daily and prednisone, has 2 days left of prednisone with no improvement in bleeding/SOB. Labs drawn on 6/23/2020 showed a hemoglobin of 8.9. Previously evaluated on 5/20/2020, had a hemoglobin of 9.9. Last colonoscopy was completed January 7, 2020. Past Medical History:   Diagnosis Date    Arrhythmia     a tach     Atrial tachycardia ([address]     Colitis, chronic, ulcerative ([address] 2013    GERD (gastroesophageal reflux disease)     Palpitations     Sleep apnea     has lost weight, better now.   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[facility])        Past Surgical History:   Procedure Laterality Date    BREAST SURGERY PROCEDURE UNLISTED      lumpectomy 1998    COLONOSCOPY N/A 7/24/2018    COLONOSCOPY performed by Sharmaine Price MD at [address] [facility] COLONOSCOPY N/A 1/7/2020    COLONOSCOPY performed by Bandar Chandler MD at [address] [facility]         Family History:   Problem Relation Age of Onset    Other Father         lung and colon cancer    Cancer Father     Cancer Mother     Diabetes Paternal Uncle        Social History     Socioeconomic History    Marital status:      Spouse name: Not on file    Number of children: Not on file    Years of education: Not on file    Highest education level: Not on file Occupational History    Not on file   Social Needs    Financial resource strain: Not on file    Food insecurity     Worry: Not on file     Inability: Not on file    Transportation needs     Medical: Not on file     Non-medical: Not on file   Tobacco Use    Smoking status: Former Smoker     Last attempt to quit:      Years since quittin.5    Smokeless tobacco: Never Used   Substance and Sexual Activity    Alcohol use: No    Drug use: No    Sexual activity: Not on file   Lifestyle    Physical activity     Days per week: Not on file     Minutes per session: Not on file    Stress: Not on file   Relationships    Social connections     Talks on phone: Not on file     Gets together: Not on file     Attends Islam service: Not on file     Active member of club or organization: Not on file     Attends meetings of clubs or organizations: Not on file     Relationship status: Not on file    Intimate partner violence     Fear of current or ex partner: Not on file     Emotionally abused: Not on file     Physically abused: Not on file     Forced sexual activity: Not on file   Other Topics Concern    Not on file   Social History Narrative    Not on file         ALLERGIES: Adhesive tape-silicones and Wellbutrin [bupropion]    Review of Systems   Constitutional: Negative for chills and fever. HENT: Negative for congestion and sore throat. Eyes: Negative for pain. Respiratory: Positive for shortness of breath. Negative for cough. Cardiovascular: Negative for chest pain and palpitations. Gastrointestinal: Positive for diarrhea. Negative for abdominal pain, nausea and vomiting. Genitourinary: Negative for dysuria, frequency and urgency. Musculoskeletal: Negative for back pain and neck pain. Neurological: Positive for light-headedness. Negative for dizziness and headaches.        Vitals:    20 0843   BP: 113/65   Pulse: (!) 103   Resp: 15   Temp: 98.2 °F (36.8 °C)   SpO2: 99%   Weight: 69.9 kg (154 lb)   Height: 5' 6\" (1.676 m)            Physical Exam  Vitals signs and nursing note reviewed. Constitutional:       Appearance: Normal appearance. HENT:      Head: Normocephalic and atraumatic. Nose: Nose normal.   Eyes:      Extraocular Movements: Extraocular movements intact. Conjunctiva/sclera: Conjunctivae normal.      Pupils: Pupils are equal, round, and reactive to light. Neck:      Musculoskeletal: Normal range of motion and neck supple. Cardiovascular:      Rate and Rhythm: Normal rate and regular rhythm. Pulses: Normal pulses. Radial pulses are 2+ on the right side and 2+ on the left side. Posterior tibial pulses are 2+ on the right side and 2+ on the left side. Heart sounds: Normal heart sounds. Pulmonary:      Effort: Pulmonary effort is normal. No accessory muscle usage or respiratory distress. Breath sounds: Normal breath sounds. Abdominal:      General: Abdomen is flat. Bowel sounds are normal.      Palpations: Abdomen is soft. Musculoskeletal: Normal range of motion. Skin:     General: Skin is warm and dry. Capillary Refill: Capillary refill takes less than 2 seconds. Neurological:      General: No focal deficit present. Mental Status: She is alert and oriented to person, place, and time. Mental status is at baseline. Psychiatric:         Mood and Affect: Mood normal.         Behavior: Behavior normal.         Thought Content: Thought content normal.          MDM  Number of Diagnoses or Management Options  Lightheadedness:   SOB (shortness of breath):   Symptomatic anemia: Tachycardia:   Ulcerative colitis with rectal bleeding, unspecified location [facility]):   Diagnosis management comments: 4:20 PM  Patient is being admitted to the hospital.  The results of their tests and reasons for their admission have been discussed with them and/or available family.   They convey agreement and understanding for the need to be admitted and for their admission diagnosis. Consultation was made with GI specialist, recommended IV methylprednisone. Consultation was made with attending, with hemoglobin decrease, tachycardia, and hypotension the decision to admit was made. Consultation has been made with the inpatient physician specialist for hospitalization. LABORATORY TESTS:  Recent Results (from the past 12 hour(s))  -SAMPLES BEING HELD  Collection Time: 07/05/20  8:47 AM       Result                      Value             Ref Range           SAMPLES BEING HELD          1BL,1RD,1SST                          COMMENT                                                       Add-on orders for these samples will be processed based on acceptable specimen integrity and analyte stability, which may vary by analyte. -METABOLIC PANEL, COMPREHENSIVE  Collection Time: 07/05/20  8:47 AM       Result                      Value             Ref Range           Sodium                      137               136 - 145 mm*       Potassium                   3.6               3.5 - 5.1 mm*       Chloride                    103               97 - 108 mmo*       CO2                         30                21 - 32 mmol*       Anion gap                   4 (L)             5 - 15 mmol/L       Glucose                     192 (H)           65 - 100 mg/*       BUN                         12                6 - 20 MG/DL        Creatinine                  0.79              0.55 - 1.02 *       BUN/Creatinine ratio        15                12 - 20             GFR est AA                  >60               >60 ml/min/1*       GFR est non-AA              >60               >60 ml/min/1*       Calcium                     8.3 (L)           8.5 - 10.1 M*       Bilirubin, total            0.4               0.2 - 1.0 MG*       ALT (SGPT)                  15                12 - 78 U/L         AST (SGOT)                  10 (L)            15 - 37 U/L         Alk.  phosphatase 89                45 - 117 U/L        Protein, total              6.7               6.4 - 8.2 g/*       Albumin                     2.2 (L)           3.5 - 5.0 g/*       Globulin                    4.5 (H)           2.0 - 4.0 g/*       A-G Ratio                   0.5 (L)           1.1 - 2.2      -TROPONIN I  Collection Time: 07/05/20  8:47 AM       Result                      Value             Ref Range           Troponin-I, Qt.             <0.05             <0.05 ng/mL    -CBC WITH AUTOMATED DIFF  Collection Time: 07/05/20  8:47 AM       Result                      Value             Ref Range           WBC                         14.0 (H)          3.6 - 11.0 K*       RBC                         3.00 (L)          3.80 - 5.20 *       HGB                         8.0 (L)           11.5 - 16.0 *       HCT                         26.7 (L)          35.0 - 47.0 %       MCV                         89.0              80.0 - 99.0 *       MCH                         26.7              26.0 - 34.0 *       MCHC                        30.0              30.0 - 36.5 *       RDW                         15.0 (H)          11.5 - 14.5 %       PLATELET                    604 (H)           150 - 400 K/*       MPV                         8.0 (L)           8.9 - 12.9 FL       NRBC                        0.0               0  WBC       ABSOLUTE NRBC               0.00              0.00 - 0.01 *       NEUTROPHILS                 80 (H)            32 - 75 %           LYMPHOCYTES                 9 (L)             12 - 49 %           MONOCYTES                   7                 5 - 13 %            EOSINOPHILS                 3                 0 - 7 %             BASOPHILS                   1                 0 - 1 %             IMMATURE GRANULOCYTES       0                 0.0 - 0.5 %         ABS. NEUTROPHILS            11.2 (H)          1.8 - 8.0 K/*       ABS. LYMPHOCYTES            1.3               0.8 - 3.5 K/*       ABS.  MONOCYTES 1.0               0.0 - 1.0 K/*       ABS. EOSINOPHILS            0.4               0.0 - 0.4 K/*       ABS. BASOPHILS              0.1               0.0 - 0.1 K/*       ABS. IMM.  GRANS.            0.0               0.00 - 0.04 *       DF                          SMEAR SCANNED                         RBC COMMENTS                                                  NORMOCYTIC, NORMOCHROMIC       WBC COMMENTS                                                  TOXIC GRANULATION  -NT-PRO BNP  Collection Time: 07/05/20  8:47 AM       Result                      Value             Ref Range           NT pro-BNP                  394 (H)           <125 PG/ML     -TYPE & SCREEN  Collection Time: 07/05/20 10:25 AM       Result                      Value             Ref Range           Crossmatch Expiration       07/08/2020                            ABO/Rh(D)                   [place] NEGATIVE                            Antibody screen             NEG                                   Unit number                 [id number]                         Blood component type        RC LR                                 Unit division               00                                    Status of unit              ISSUED                                Crossmatch result           Compatible                       -URINALYSIS W/MICROSCOPIC  Collection Time: 07/05/20 12:09 PM       Result                      Value             Ref Range           Color                       DARK YELLOW                           Appearance                  CLOUDY (A)        CLEAR               Specific gravity            1.030             1.003 - 1.03*       pH (UA)                     5.5               5.0 - 8.0           Protein                     Negative          NEG mg/dL           Glucose                     Negative          NEG mg/dL           Ketone                      TRACE (A)         NEG mg/dL           Bilirubin                   Negative NEG                 Blood                       Negative          NEG                 Urobilinogen                0.2               0.2 - 1.0 EU*       Nitrites                    Negative          NEG                 Leukocyte Esterase          Negative          NEG                 WBC                         0-4               0 - 4 /hpf          RBC                         0-5               0 - 5 /hpf          Epithelial cells            FEW               FEW /lpf            Bacteria                    Negative          NEG /hpf            CA Oxalate crystals         3+ (A)            NEG            -URINE CULTURE HOLD SAMPLE  Collection Time: 07/05/20 12:09 PM       Result                      Value             Ref Range           Urine culture hold                                            Urine on hold in Microbiology dept for 2 days. If unpreserved urine is submitted, it cannot be used for addtional testing after 24 hours, recollection will be required.   -HEMOGLOBIN  Collection Time: 07/05/20  1:17 PM       Result                      Value             Ref Range           HGB                         7.5 (L)           11.5 - 16.0 *  -HEMATOCRIT  Collection Time: 07/05/20  1:17 PM       Result                      Value             Ref Range           HCT                         24.8 (L)          35.0 - 47.0 %    MEDICATIONS GIVEN:  Medications  methylPREDNISolone (PF) (SOLU-MEDROL) injection 30 mg (has no administration in time range)  sodium chloride 0.9 % bolus infusion 1,000 mL (0 mL IntraVENous IV Completed 7/5/20 1300)  methylPREDNISolone (PF) (Solu-MEDROL) injection 30 mg (30 mg IntraVENous Given 7/5/20 1225)  sodium chloride 0.9 % bolus infusion 1,000 mL (0 mL IntraVENous IV Completed 7/5/20 1428)    IMPRESSION:  Ulcerative colitis with rectal bleeding, unspecified location ([facility]  (primary encounter diagnosis)  Symptomatic anemia  SOB (shortness of breath)  Lightheadedness  Tachycardia    PLAN:  1. Admit to hospitalist.          Amount and/or Complexity of Data Reviewed  Decide to obtain previous medical records or to obtain history from someone other than the patient: yes      ED Course as of  1220   Sun 2020   0845 EK:42 AMSinus tachycardia, ventricular 101, normal axis, no ST-T wave deviation.    [NS]   1211 Consulted GI: recommended 30 mg IV methylprednisone, C diff profile, with close follow up with GI tomorrow. Does not recommend admission at this time for UC flare.     [EK]      ED Course User Index  [EK] Papa Monsalve PA-C  [NS] Caio Sadler MD       Procedures    Hospitalist Perfect Serve for Admission  1:47 PM    ED Room Number: SN04/56  Patient Name and age:  Tiffanie Miller 77 y.o.  female  Working Diagnosis:   1. Ulcerative colitis with rectal bleeding, unspecified location ([facility]    2. Symptomatic anemia    3. SOB (shortness of breath)    4. Lightheadedness    5. Tachycardia        COVID-19 Suspicion:  no    Code Status:  Full Code  Readmission: no  Isolation Requirements:  no  Recommended Level of Care:  telemetry  Department:[facility] Adult ED - 21   Other:  76 yo presented today with symptomatic anemia with shortness of breath, lightheadedness, headaches, rapid heartbeat, and painless rectal bleeding. No fever/abdominal pain. History of ulcerative colitis, states she bleeds anywhere from 1/4 cup to 1/3 cup Blood per day. Patient is tachycardic at 103, BP: 92/53. Currently on mesalamine, prednisone, and vedolizumab. Currently on 3 iron pills daily. On 2020 hemoglobin was 9.9. On 2020 hemoglobin was 8.9. Today hemoglobin was initially 8.0, has since dropped to 7.5.

## 2020-07-05 NOTE — PROGRESS NOTES
1715-  TRANSFER - IN REPORT:    Verbal report received from [address] (name) on [address]  being received from ED(unit) for routine progression of care      Report consisted of patients Situation, Background, Assessment and   Recommendations(SBAR). Information from the following report(s) SBAR, Kardex and Recent Results was reviewed with the receiving nurse. Opportunity for questions and clarification was provided. Assessment completed upon patients arrival to unit and care assumed. Jaja Zuleta PRBC is running not done Yet. . received the pt from ED with Blood transfusion. Jaja Zuleta PRBC done at 1815. .     2230-  HH is due tomorrow with am lab per MD Maury Gonzalez. Jaja Zuleta

## 2020-07-06 VITALS
SYSTOLIC BLOOD PRESSURE: 108 MMHG | HEART RATE: 94 BPM | OXYGEN SATURATION: 95 % | WEIGHT: 154 LBS | TEMPERATURE: 98.1 F | HEIGHT: 66 IN | BODY MASS INDEX: 24.75 KG/M2 | DIASTOLIC BLOOD PRESSURE: 62 MMHG | RESPIRATION RATE: 18 BRPM

## 2020-07-06 PROBLEM — A04.72 C. DIFFICILE DIARRHEA: Status: ACTIVE | Noted: 2020-07-06

## 2020-07-06 LAB
ABO + RH BLD: NORMAL
ANION GAP SERPL CALC-SCNC: 4 MMOL/L (ref 5–15)
BLD PROD TYP BPU: NORMAL
BLOOD GROUP ANTIBODIES SERPL: NORMAL
BPU ID: NORMAL
BUN SERPL-MCNC: 7 MG/DL (ref 6–20)
BUN/CREAT SERPL: 16 (ref 12–20)
C DIFF TOX GENS STL QL NAA+PROBE: POSITIVE
CALCIUM SERPL-MCNC: 8.1 MG/DL (ref 8.5–10.1)
CAMPYLOBACTER SPECIES, DNA: NEGATIVE
CHLORIDE SERPL-SCNC: 109 MMOL/L (ref 97–108)
CO2 SERPL-SCNC: 26 MMOL/L (ref 21–32)
CREAT SERPL-MCNC: 0.45 MG/DL (ref 0.55–1.02)
CROSSMATCH RESULT,%XM: NORMAL
ENTEROTOXIGEN E COLI, DNA: NEGATIVE
ERYTHROCYTE [DISTWIDTH] IN BLOOD BY AUTOMATED COUNT: 14.7 % (ref 11.5–14.5)
GLUCOSE SERPL-MCNC: 117 MG/DL (ref 65–100)
HCT VFR BLD AUTO: 25.9 % (ref 35–47)
HGB BLD-MCNC: 8.1 G/DL (ref 11.5–16)
INTERPRETATION: ABNORMAL
MCH RBC QN AUTO: 27.7 PG (ref 26–34)
MCHC RBC AUTO-ENTMCNC: 31.3 G/DL (ref 30–36.5)
MCV RBC AUTO: 88.7 FL (ref 80–99)
NRBC # BLD: 0 K/UL (ref 0–0.01)
NRBC BLD-RTO: 0 PER 100 WBC
P SHIGELLOIDES DNA STL QL NAA+PROBE: NEGATIVE
PCR REFLEX: ABNORMAL
PLATELET # BLD AUTO: 534 K/UL (ref 150–400)
PMV BLD AUTO: 8.2 FL (ref 8.9–12.9)
POTASSIUM SERPL-SCNC: 3.8 MMOL/L (ref 3.5–5.1)
RBC # BLD AUTO: 2.92 M/UL (ref 3.8–5.2)
SALMONELLA SPECIES, DNA: NEGATIVE
SHIGA TOXIN PRODUCING, DNA: NEGATIVE
SHIGELLA SP+EIEC IPAH STL QL NAA+PROBE: NEGATIVE
SODIUM SERPL-SCNC: 139 MMOL/L (ref 136–145)
SPECIMEN EXP DATE BLD: NORMAL
STATUS OF UNIT,%ST: NORMAL
UNIT DIVISION, %UDIV: 0
VIBRIO SPECIES, DNA: NEGATIVE
WBC # BLD AUTO: 9.6 K/UL (ref 3.6–11)
WBC #/AREA STL HPF: NORMAL /HPF (ref 0–4)
Y. ENTEROCOLITICA, DNA: NEGATIVE

## 2020-07-06 PROCEDURE — 80048 BASIC METABOLIC PNL TOTAL CA: CPT

## 2020-07-06 PROCEDURE — 74011250637 HC RX REV CODE- 250/637: Performed by: NURSE PRACTITIONER

## 2020-07-06 PROCEDURE — 74011250636 HC RX REV CODE- 250/636: Performed by: INTERNAL MEDICINE

## 2020-07-06 PROCEDURE — 36415 COLL VENOUS BLD VENIPUNCTURE: CPT

## 2020-07-06 PROCEDURE — 74011250637 HC RX REV CODE- 250/637: Performed by: INTERNAL MEDICINE

## 2020-07-06 PROCEDURE — 74011250636 HC RX REV CODE- 250/636: Performed by: SPECIALIST

## 2020-07-06 PROCEDURE — 85027 COMPLETE CBC AUTOMATED: CPT

## 2020-07-06 RX ORDER — ATENOLOL 25 MG/1
25 TABLET ORAL DAILY
Status: DISCONTINUED | OUTPATIENT
Start: 2020-07-07 | End: 2020-07-06

## 2020-07-06 RX ORDER — PREDNISONE 10 MG/1
TABLET ORAL
Qty: 42 TAB | Refills: 0 | Status: SHIPPED | OUTPATIENT
Start: 2020-07-06 | End: 2020-08-04

## 2020-07-06 RX ORDER — VANCOMYCIN HYDROCHLORIDE 250 MG/5ML
125 POWDER, FOR SOLUTION ORAL EVERY 6 HOURS
Qty: 140 ML | Refills: 0 | Status: SHIPPED | OUTPATIENT
Start: 2020-07-07 | End: 2020-07-21

## 2020-07-06 RX ORDER — PREDNISONE 20 MG/1
40 TABLET ORAL
Status: DISCONTINUED | OUTPATIENT
Start: 2020-07-07 | End: 2020-07-06 | Stop reason: HOSPADM

## 2020-07-06 RX ORDER — VANCOMYCIN HYDROCHLORIDE 250 MG/5ML
125 POWDER, FOR SOLUTION ORAL EVERY 6 HOURS
Status: DISCONTINUED | OUTPATIENT
Start: 2020-07-06 | End: 2020-07-06 | Stop reason: HOSPADM

## 2020-07-06 RX ORDER — ATENOLOL 25 MG/1
25 TABLET ORAL DAILY
Status: DISCONTINUED | OUTPATIENT
Start: 2020-07-06 | End: 2020-07-06 | Stop reason: HOSPADM

## 2020-07-06 RX ADMIN — VANCOMYCIN HYDROCHLORIDE 125 MG: KIT at 13:51

## 2020-07-06 RX ADMIN — ATENOLOL 25 MG: 25 TABLET ORAL at 15:30

## 2020-07-06 RX ADMIN — VANCOMYCIN HYDROCHLORIDE 125 MG: KIT at 17:44

## 2020-07-06 RX ADMIN — IRON SUCROSE 100 MG: 20 INJECTION, SOLUTION INTRAVENOUS at 17:44

## 2020-07-06 RX ADMIN — METHYLPREDNISOLONE SODIUM SUCCINATE 30 MG: 40 INJECTION, POWDER, FOR SOLUTION INTRAMUSCULAR; INTRAVENOUS at 09:28

## 2020-07-06 RX ADMIN — Medication 10 ML: at 13:51

## 2020-07-06 NOTE — CONSULTS
Reason for Consult: Tachycardia    HPI: Rufus Nevarez is a 77 y.o. female with past medical history significant for ulcerative colitis, chronic diarrhea who presented with symptoms of diarrhea and hematochezia. She was found to have significant anemia on presentation. Her hemoglobin was 8.0. A drop down to 7.5. She needed blood transfusions. On presentation she also had symptoms of shortness of breath and palpitations along with headaches. No symptoms of fever, chills, cough, constipation. At home she has a pulse ox which showed HR at 140bpm. During admission she was noted to have tachycardia. She has a past history of atrial tachycardia diagnosed 15 yrs ago and has had holter monitor twice since then. She follows with Dr. Hodan Tom of [facility] who recently perfomed an Echo which was normal and was planning to perform a loop recorder some time in near future. She had an episode of syncope in 2013 and her echocardiogram was normal.      EKG at presentation demonstrated sinus tachycardia with heart rate of 101 bpm, with normal ST segment with normal axis with normal    Telemetry demonstrated: Frequent episodes of tachycardia with possible atrial tach and occasional PVC, couplets and triplets. Plan:    1. Tachycardia: Likely sinus tachycardia or atrial tachycardia precipitated by severe anemia as well as rebound from holding atenolol. Rule out other causes. Will check TSH. Should settle down when she has received blood transfusion and hemoglobin more stable. Blood pressure is better therefore we will resume atenolol 25 mg p.o. daily. Troponins are negative. Will need pending workup of loop recorder with Dr. Hodan Tom when discharged. 2.  History of syncope: No recurrence of symptoms since 2013. ATTENTION:   This medical record was transcribed using an electronic medical records/speech recognition system. Although proofread, it may and can contain electronic, spelling and other errors.   Corrections may be executed at a later time. Please feel free to contact us for any clarifications as needed. Past Medical History:   Diagnosis Date    Arrhythmia     a tach     [facility]     Colitis, chronic, ulcerative ([facility]     GERD (gastroesophageal reflux disease)     Palpitations     Sleep apnea     has lost weight, better now.   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[facility])             Past Surgical History:   Procedure Laterality Date    BREAST SURGERY PROCEDURE UNLISTED      lumpectomy     COLONOSCOPY N/A 2018    COLONOSCOPY performed by Sharmaine Price MD at [address] [facility] COLONOSCOPY N/A 2020    COLONOSCOPY performed by Bandar Chandler MD at [address] [facility]             Family History   Problem Relation Age of Onset    Other Father         lung and colon cancer    Cancer Father     Cancer Mother     Diabetes Paternal Uncle            Social History     Socioeconomic History    Marital status:      Spouse name: Not on file    Number of children: Not on file    Years of education: Not on file    Highest education level: Not on file   Occupational History    Not on file   Social Needs    Financial resource strain: Not on file    Food insecurity     Worry: Not on file     Inability: Not on file    Transportation needs     Medical: Not on file     Non-medical: Not on file   Tobacco Use    Smoking status: Former Smoker     Last attempt to quit: 2003     Years since quittin.5    Smokeless tobacco: Never Used   Substance and Sexual Activity    Alcohol use: No    Drug use: No    Sexual activity: Not on file   Lifestyle    Physical activity     Days per week: Not on file     Minutes per session: Not on file    Stress: Not on file   Relationships    Social connections     Talks on phone: Not on file     Gets together: Not on file     Attends Confucianist service: Not on file     Active member of club or organization: Not on file     Attends meetings of clubs or organizations: Not on file     Relationship status: Not on file    Intimate partner violence     Fear of current or ex partner: Not on file     Emotionally abused: Not on file     Physically abused: Not on file     Forced sexual activity: Not on file   Other Topics Concern    Not on file   Social History Narrative    Not on file         Allergies   Allergen Reactions    Adhesive Tape-Silicones Contact Dermatitis    Wellbutrin [Bupropion] Rash            Current Facility-Administered Medications   Medication Dose Route Frequency Provider Last Rate Last Dose    sodium chloride (NS) flush 5-40 mL  5-40 mL IntraVENous Q8H Jyotsna Desai MD   10 mL at 07/05/20 2038    sodium chloride (NS) flush 5-40 mL  5-40 mL IntraVENous PRN Jyotsna Desai MD        0.9% sodium chloride infusion  75 mL/hr IntraVENous CONTINUOUS DanielJoni shannon MD 75 mL/hr at 07/06/20 0815 75 mL/hr at 07/06/20 0815    methylPREDNISolone (PF) (SOLU-MEDROL) injection 30 mg  30 mg IntraVENous Q12H Raquel Cleary MD   30 mg at 07/05/20 2038    atorvastatin (LIPITOR) tablet 20 mg  20 mg Oral QHS Rain Urbina MD   20 mg at 07/05/20 2115    zolpidem (AMBIEN) tablet 5 mg  5 mg Oral QHS PRN Rain Urbina MD   5 mg at 07/05/20 2115        ROS:  12 point review of systems was performed.  All negative except for HPI     Physical Exam:  Visit Vitals  /70 (BP 1 Location: Right arm, BP Patient Position: At rest)   Pulse (!) 136   Temp 97.8 °F (36.6 °C)   Resp 18   Ht 5' 6\" (1.676 m)   Wt 154 lb (69.9 kg)   SpO2 97%   Breastfeeding No   BMI 24.86 kg/m²       Gen:  Well-developed, well-nourished, in no acute distress  HEENT:  Pink conjunctivae, PERRL, hearing intact to voice, moist mucous membranes  Neck:  Supple, without masses, thyroid non-tender  Resp:  No accessory muscle use, clear breath sounds without wheezes rales or rhonchi  Card:  No murmurs, normal S1, S2 without thrills, bruits or peripheral edema  Abd:  Soft, non-tender, non-distended, normoactive bowel sounds are present, no palpable organomegaly and no detectable hernias  Lymph:  No cervical or inguinal adenopathy  Musc:  No cyanosis or clubbing  Skin:  No rashes or ulcers, skin turgor is good  Neuro:  Cranial nerves are grossly intact, no focal motor weakness, follows commands appropriately  Psych:  Good insight, oriented to person, place and time, alert     Labs:     Lab Results   Component Value Date/Time    WBC 9.6 07/06/2020 05:12 AM    HGB 8.1 (L) 07/06/2020 05:12 AM    Hemoglobin (POC) 12.6 07/17/2013 02:36 PM    HCT 25.9 (L) 07/06/2020 05:12 AM    Hematocrit (POC) 37 07/17/2013 02:36 PM    PLATELET 200 (H) 96/51/8843 05:12 AM    MCV 88.7 07/06/2020 05:12 AM     Lab Results   Component Value Date/Time    Hemoglobin A1c 6.2 (H) 07/05/2020 01:17 PM    Glucose 117 (H) 07/06/2020 05:12 AM    Glucose (POC) 109 (H) 07/17/2013 02:36 PM    Creatinine (POC) 0.9 07/17/2013 02:36 PM    Creatinine 0.45 (L) 07/06/2020 05:12 AM      No results found for: CHOL, CHOLPOCT, HDL, LDL, LDLC, LDLCPOC, LDLCEXT, TRIGL, TGLPOCT, CHHD, CHHDX  Lab Results   Component Value Date/Time    ALT (SGPT) 15 07/05/2020 08:47 AM    Alk.  phosphatase 89 07/05/2020 08:47 AM    Bilirubin, direct <0.1 07/17/2013 02:38 PM    Bilirubin, total 0.4 07/05/2020 08:47 AM    Albumin 2.2 (L) 07/05/2020 08:47 AM    Protein, total 6.7 07/05/2020 08:47 AM    PLATELET 794 (H) 98/50/9147 05:12 AM     No results found for: INR, INREXT, PTMR, PTP, PT1, PT2, INREXT   Lab Results   Component Value Date/Time    GFR est non-AA >60 07/06/2020 05:12 AM    GFRNA, POC >60 07/17/2013 02:36 PM    GFR est AA >60 07/06/2020 05:12 AM    GFRAA, POC >60 07/17/2013 02:36 PM    Creatinine 0.45 (L) 07/06/2020 05:12 AM    Creatinine (POC) 0.9 07/17/2013 02:36 PM    BUN 7 07/06/2020 05:12 AM    BUN (POC) 19 07/17/2013 02:36 PM    Sodium 139 07/06/2020 05:12 AM    Sodium (POC) 135 (L) 07/17/2013 02:36 PM    Potassium 3.8 07/06/2020 05:12 AM    Potassium (POC) 4.2 07/17/2013 02:36 PM    Chloride 109 (H) 07/06/2020 05:12 AM    Chloride (POC) 96 (L) 07/17/2013 02:36 PM    CO2 26 07/06/2020 05:12 AM    Magnesium 2.0 07/18/2013 03:03 AM    Phosphorus 2.6 07/18/2013 03:03 AM     No results found for: AZUCENA, Ginna Scott, [id number], [id number], PSALT  Lab Results   Component Value Date/Time    TSH 1.11 07/18/2013 03:03 AM      Lab Results   Component Value Date/Time    Glucose 117 (H) 07/06/2020 05:12 AM    Glucose (POC) 109 (H) 07/17/2013 02:36 PM      Lab Results   Component Value Date/Time    Troponin-I, Qt. <0.05 07/05/2020 08:47 AM      Lab Results   Component Value Date/Time    NT pro- (H) 07/05/2020 08:47 AM      Lab Results   Component Value Date/Time    Sodium 139 07/06/2020 05:12 AM    Potassium 3.8 07/06/2020 05:12 AM    Chloride 109 (H) 07/06/2020 05:12 AM    CO2 26 07/06/2020 05:12 AM    Anion gap 4 (L) 07/06/2020 05:12 AM    Glucose 117 (H) 07/06/2020 05:12 AM    BUN 7 07/06/2020 05:12 AM    Creatinine 0.45 (L) 07/06/2020 05:12 AM    BUN/Creatinine ratio 16 07/06/2020 05:12 AM    GFR est AA >60 07/06/2020 05:12 AM    GFR est non-AA >60 07/06/2020 05:12 AM    Calcium 8.1 (L) 07/06/2020 05:12 AM      Lab Results   Component Value Date/Time    Sodium 139 07/06/2020 05:12 AM    Potassium 3.8 07/06/2020 05:12 AM    Chloride 109 (H) 07/06/2020 05:12 AM    CO2 26 07/06/2020 05:12 AM    Anion gap 4 (L) 07/06/2020 05:12 AM    Glucose 117 (H) 07/06/2020 05:12 AM    BUN 7 07/06/2020 05:12 AM    Creatinine 0.45 (L) 07/06/2020 05:12 AM    BUN/Creatinine ratio 16 07/06/2020 05:12 AM    GFR est AA >60 07/06/2020 05:12 AM    GFR est non-AA >60 07/06/2020 05:12 AM    Calcium 8.1 (L) 07/06/2020 05:12 AM    Bilirubin, total 0.4 07/05/2020 08:47 AM    ALT (SGPT) 15 07/05/2020 08:47 AM    Alk.  phosphatase 89 07/05/2020 08:47 AM    Protein, total 6.7 07/05/2020 08:47 AM    Albumin 2.2 (L) 07/05/2020 08:47 AM Globulin 4.5 (H) 07/05/2020 08:47 AM    A-G Ratio 0.5 (L) 07/05/2020 08:47 AM      Lab Results   Component Value Date/Time    Hemoglobin A1c 6.2 (H) 07/05/2020 01:17 PM         Recent Labs     07/05/20  0847   TROIQ <0.05           Problem List:     Problem List  Date Reviewed: 7/18/2013          Codes Class Noted    Ulcerative colitis ([facility] ICD-10-CM: K51.90  ICD-9-CM: 556.9  7/5/2020        Anemia ICD-10-CM: D64.9  ICD-9-CM: 285.9  7/5/2020        GERD (gastroesophageal reflux disease) ICD-10-CM: K21.9  ICD-9-CM: 530.81  7/5/2020        Leukocytosis ICD-10-CM: Z83.209  ICD-9-CM: 288.60  7/5/2020        Hyperglycemia ICD-10-CM: R73.9  ICD-9-CM: 790.29  7/5/2020        Syncope and collapse ICD-10-CM: R55  ICD-9-CM: 780.2  7/17/2013        * (Principal) GI bleed ICD-10-CM: K92.2  ICD-9-CM: 578.9  7/17/2013        Ulcerative proctitis (HCC) (Chronic) ICD-10-CM: K51.20  ICD-9-CM: 556.2  7/17/2013        Depression (Chronic) ICD-10-CM: F32.9  ICD-9-CM: 236  7/17/2013        Atrial tachycardia ([facility] ICD-10-CM: I47.1  ICD-9-CM: 427.89  Unknown        Palpitations ICD-10-CM: R00.2  ICD-9-CM: 785.1  Unknown        Arrhythmia Atrial Fibrillation ICD-10-CM: I48.91  ICD-9-CM: 427.31  11/30/2010                Marvin Badillo MD, [facility]

## 2020-07-06 NOTE — DISCHARGE SUMMARY
Hospitalist Discharge Summary     Patient ID:    Cas Estrada  [id number]  43 y.o.  1950    Admit date of service: 7/5/2020    Discharge date of service: 7/6/2020    Admission Diagnoses: GI bleed [K92.2]  GI bleed [K92.2]    Chronic Diagnoses:    Problem List as of 7/6/2020 Date Reviewed: 7/18/2013          Codes Class Noted - Resolved    C. difficile diarrhea ICD-10-CM: A04.72  ICD-9-CM: 008.45  7/6/2020 - Present        Ulcerative colitis ([facility] ICD-10-CM: K51.90  ICD-9-CM: 556.9  7/5/2020 - Present        Anemia ICD-10-CM: D64.9  ICD-9-CM: 285.9  7/5/2020 - Present        GERD (gastroesophageal reflux disease) ICD-10-CM: K21.9  ICD-9-CM: 530.81  7/5/2020 - Present        Leukocytosis ICD-10-CM: D72.829  ICD-9-CM: 288.60  7/5/2020 - Present        Hyperglycemia ICD-10-CM: R73.9  ICD-9-CM: 790.29  7/5/2020 - Present        Syncope and collapse ICD-10-CM: R55  ICD-9-CM: 780.2  7/17/2013 - Present        * (Principal) GI bleed ICD-10-CM: K92.2  ICD-9-CM: 578.9  7/17/2013 - Present        Ulcerative proctitis ([facility] (Chronic) ICD-10-CM: K51.20  ICD-9-CM: 556.2  7/17/2013 - Present        Depression (Chronic) ICD-10-CM: F32.9  ICD-9-CM: 178  7/17/2013 - Present        Atrial tachycardia ([facility] ICD-10-CM: I47.1  ICD-9-CM: 427.89  Unknown - Present        Palpitations ICD-10-CM: R00.2  ICD-9-CM: 785.1  Unknown - Present        Arrhythmia Atrial Fibrillation ICD-10-CM: I48.91  ICD-9-CM: 427.31  11/30/2010 - Present              Discharge Medications:   Current Discharge Medication List      START taking these medications    Details   vancomycin (FIRVANQ) 50 mg/mL oral solution Take 2.5 mL by mouth every six (6) hours for 14 days. Qty: 140 mL, Refills: 0      predniSONE (DELTASONE) 10 mg tablet Take 40 mg by mouth daily for 2 days, THEN 20 mg daily for 7 days, THEN 10 mg daily for 20 days.   Qty: 42 Tab, Refills: 0         CONTINUE these medications which have NOT CHANGED    Details   mesalamine (CANASA) 1,000 mg suppository Insert  into rectum two (2) times a day. turmeric root extract 500 mg cap Take 1 Cap by mouth daily. atorvastatin (LIPITOR) 20 mg tablet Take 20 mg by mouth daily. cyclobenzaprine (FLEXERIL) 5 mg tablet Take 5 mg by mouth as needed for Muscle Spasm(s). zolpidem (AMBIEN) 10 mg tablet Take 5 mg by mouth nightly as needed for Sleep. atenolol (TENORMIN) 25 mg tablet Take 25 mg by mouth daily. acyclovir (ZOVIRAX) 400 mg tablet Take 400 mg by mouth two (2) times a day. citalopram (CELEXA) 20 mg tablet Take 20 mg by mouth daily. budesonide (ENTOCORT EC) 3 mg capsule Take 9 mg by mouth every morning. mineral oil (FLEET) enema Insert  into rectum nightly. Mesalamine enema with flare ups only. mesalamine ER (APRISO) 0.375 gram capsule Take 1.5 g by mouth daily. calcium carbonate (OS-VICENTA) 500 mg calcium (1,250 mg) tablet Take  by mouth daily. aspirin 81 mg chewable tablet Take 81 mg by mouth every Monday, Wednesday, Friday. Follow up Care:    1. Delisa Baron DO in 1-2 weeks  2. Dr Karlene Ingram    Diet:  Regular Diet    Disposition:  Home. Advanced Directive:    Discharge Exam:  See today's note. CONSULTATIONS: Cardiology and GI    Significant Diagnostic Studies:   Recent Labs     07/06/20  0512 07/05/20  1317 07/05/20  0847   WBC 9.6  --  14.0*   HGB 8.1* 7.5* 8.0*   HCT 25.9* 24.8* 26.7*   *  --  604*     Recent Labs     07/06/20  0512 07/05/20  0847    137   K 3.8 3.6   * 103   CO2 26 30   BUN 7 12   CREA 0.45* 0.79   * 192*   CA 8.1* 8.3*     Recent Labs     07/05/20  0847   ALT 15   AP 89   TBILI 0.4   TP 6.7   ALB 2.2*   GLOB 4.5*     No results for input(s): INR, PTP, APTT, INREXT in the last 72 hours. No results for input(s): FE, TIBC, PSAT, FERR in the last 72 hours. No results for input(s): PH, PCO2, PO2 in the last 72 hours. No results for input(s): CPK, CKMB in the last 72 hours.     No lab exists for component: TROPONINI  Lab Results   Component Value Date/Time    Glucose (POC) 109 (H) 07/17/2013 02:36 PM             HOSPITAL COURSE & TREATMENT RENDERED:      1.  C diff infection/ Hematochezia/diarrhea/history of ulcerative colitis. Stool for C. Difficile is positive.  Had colonoscopy by Dr. Elba Garcia in January of this year, which shows colitis.  Continue vancomycin po for 10 days and steroids.  FU with Dr Elba Garcia in 3 weeks     2.  Acute on chronic anemia secondary to GI bleed due to ulcerative colitis.  s/p one unit of PRBC transfusion. stable H&H      3.  GERD (gastroesophageal reflux disease) (7/5/2020).   Continue PPI     4.  Depression (7/17/2013).   Continue home medication     5.  Leukocytosis (7/5/2020).   likely due to c diff infection. Her UA and chest x-ray are unremarkable.      6.  Hyperglycemia (7/5/2020).   Likely due to steroid patient.  Denies diabetes mellitus.   A1c(although only note helpful in the setting of anemia) 6.2     7. Tachycardia, intermittent/ Hx of SVT. Follows with her cardiologist. Evaluated by cardiology her and advised to FU with her cardiologist for events monitor               Discharged in stable condition.     Spent 35 minutes    Signed:  Negro Ann MD  7/6/2020  5:58 PM  Hospitalist Discharge Summary     Patient ID:    Cheryle Ege  [id number]  62 y.o.  1950    Admit date of service: 7/5/2020    Discharge date of service: 7/6/2020    Admission Diagnoses: GI bleed [K92.2]  GI bleed [K92.2]    Chronic Diagnoses:    Problem List as of 7/6/2020 Date Reviewed: 7/18/2013          Codes Class Noted - Resolved    C. difficile diarrhea ICD-10-CM: A04.72  ICD-9-CM: 008.45  7/6/2020 - Present        Ulcerative colitis ([facility] ICD-10-CM: K51.90  ICD-9-CM: 556.9  7/5/2020 - Present        Anemia ICD-10-CM: D64.9  ICD-9-CM: 285.9  7/5/2020 - Present        GERD (gastroesophageal reflux disease) ICD-10-CM: K21.9  ICD-9-CM: 530.81  7/5/2020 - Present        Leukocytosis ICD-10-CM: D79.027  ICD-9-CM: 288.60  7/5/2020 - Present        Hyperglycemia ICD-10-CM: R73.9  ICD-9-CM: 790.29  7/5/2020 - Present        Syncope and collapse ICD-10-CM: R55  ICD-9-CM: 780.2  7/17/2013 - Present        * (Principal) GI bleed ICD-10-CM: K92.2  ICD-9-CM: 578.9  7/17/2013 - Present        Ulcerative proctitis (HCC) (Chronic) ICD-10-CM: K51.20  ICD-9-CM: 556.2  7/17/2013 - Present        Depression (Chronic) ICD-10-CM: F32.9  ICD-9-CM: 706  7/17/2013 - Present        Atrial tachycardia (HCC) ICD-10-CM: I47.1  ICD-9-CM: 427.89  Unknown - Present        Palpitations ICD-10-CM: R00.2  ICD-9-CM: 785.1  Unknown - Present        Arrhythmia Atrial Fibrillation ICD-10-CM: I48.91  ICD-9-CM: 427.31  11/30/2010 - Present              Discharge Medications:   Current Discharge Medication List      START taking these medications    Details   vancomycin (FIRVANQ) 50 mg/mL oral solution Take 2.5 mL by mouth every six (6) hours for 14 days. Qty: 140 mL, Refills: 0      predniSONE (DELTASONE) 10 mg tablet Take 40 mg by mouth daily for 2 days, THEN 20 mg daily for 7 days, THEN 10 mg daily for 20 days. Qty: 42 Tab, Refills: 0         CONTINUE these medications which have NOT CHANGED    Details   mesalamine (CANASA) 1,000 mg suppository Insert  into rectum two (2) times a day. turmeric root extract 500 mg cap Take 1 Cap by mouth daily. atorvastatin (LIPITOR) 20 mg tablet Take 20 mg by mouth daily. cyclobenzaprine (FLEXERIL) 5 mg tablet Take 5 mg by mouth as needed for Muscle Spasm(s). zolpidem (AMBIEN) 10 mg tablet Take 5 mg by mouth nightly as needed for Sleep. atenolol (TENORMIN) 25 mg tablet Take 25 mg by mouth daily. acyclovir (ZOVIRAX) 400 mg tablet Take 400 mg by mouth two (2) times a day. citalopram (CELEXA) 20 mg tablet Take 20 mg by mouth daily. budesonide (ENTOCORT EC) 3 mg capsule Take 9 mg by mouth every morning. mineral oil (FLEET) enema Insert  into rectum nightly. Mesalamine enema with flare ups only. mesalamine ER (APRISO) 0.375 gram capsule Take 1.5 g by mouth daily. calcium carbonate (OS-VICENTA) 500 mg calcium (1,250 mg) tablet Take  by mouth daily. aspirin 81 mg chewable tablet Take 81 mg by mouth every Monday, Wednesday, Friday. Follow up Care:    1. Milagro Alford DO in 1-2 weeks  2. GI    Diet:  Regular Diet    Disposition:  Home. Advanced Directive:    Discharge Exam:  See today's note. CONSULTATIONS: Cardiology and GI    Significant Diagnostic Studies:   Recent Labs     07/06/20  0512 07/05/20  1317 07/05/20  0847   WBC 9.6  --  14.0*   HGB 8.1* 7.5* 8.0*   HCT 25.9* 24.8* 26.7*   *  --  604*     Recent Labs     07/06/20  0512 07/05/20  0847    137   K 3.8 3.6   * 103   CO2 26 30   BUN 7 12   CREA 0.45* 0.79   * 192*   CA 8.1* 8.3*     Recent Labs     07/05/20  0847   ALT 15   AP 89   TBILI 0.4   TP 6.7   ALB 2.2*   GLOB 4.5*     No results for input(s): INR, PTP, APTT, INREXT in the last 72 hours. No results for input(s): FE, TIBC, PSAT, FERR in the last 72 hours. No results for input(s): PH, PCO2, PO2 in the last 72 hours. No results for input(s): CPK, CKMB in the last 72 hours. No lab exists for component: TROPONINI  Lab Results   Component Value Date/Time    Glucose (POC) 109 (H) 07/17/2013 02:36 PM             HOSPITAL COURSE & TREATMENT RENDERED:           Discharged in stable condition.     Spent 35 minutes    Signed:  Cain Beasley MD  7/6/2020  5:58 PM

## 2020-07-06 NOTE — PROGRESS NOTES
Maggie Gibbs. James Nielsen MD  [phone] office  [phone] [facility]     Gastroenterology Progress Note    July 6, 2020  Admit Date: 7/5/2020         Narrative Assessment and Plan   · Ulcerative colitis  · Diarrhea  · Hematochezia  · Anemia, iron deficiency  · C diff positive. The C diff may explain her recent decline, but colonoscopy done recently did reveal moderately active colitis leading to initiation of biologic therapy (vedolizumab). I suggest IV iron (venofer) x1 now, ordered. I suggest oral vancomycin 125mg QID x 10 days. I suggest oral prednisone 40mg x2 days, then 20mg daily x7 then 10mg daily until seen in follow up. I will arrange repeat C diff testing as outpatient in 14 days. I will arrange follow up with me in 21 days. After IV iron she can be discharged. Subjective:   · I'm feeling better, what is this c diff? Location:  Diarrhea, located in colon  Duration: weeks  Timing: intermittent, but worse over last 5 days  Radiation: some blood in stool   Associated Symptoms: no vomiting no fever  Aggravating/Alleviating factors:     ROS:  The previous review of systems on initial consultation / H&P is noted and reviewed. Specific changes noted above in HPI. Current Medications:     Current Facility-Administered Medications   Medication Dose Route Frequency    vancomycin (FIRVANQ) 50 mg/mL oral solution 125 mg  125 mg Oral Q6H    atenoloL (TENORMIN) tablet 25 mg  25 mg Oral DAILY    [START ON 7/7/2020] predniSONE (DELTASONE) tablet 40 mg  40 mg Oral DAILY WITH BREAKFAST    iron sucrose (VENOFER) injection 100 mg  100 mg IntraVENous ONCE    sodium chloride (NS) flush 5-40 mL  5-40 mL IntraVENous Q8H    sodium chloride (NS) flush 5-40 mL  5-40 mL IntraVENous PRN    atorvastatin (LIPITOR) tablet 20 mg  20 mg Oral QHS    zolpidem (AMBIEN) tablet 5 mg  5 mg Oral QHS PRN       Objective:     VITALS:   Last 24hrs VS reviewed since prior progress note.  Most recent are:  Visit Vitals  /62 (BP 1 Location: Right arm, BP Patient Position: At rest)   Pulse 94   Temp 98.1 °F (36.7 °C)   Resp 18   Ht 5' 6\" (1.676 m)   Wt 69.9 kg (154 lb)   SpO2 95%   Breastfeeding No   BMI 24.86 kg/m²     Temp (24hrs), Av.3 °F (36.8 °C), Min:97.7 °F (36.5 °C), Max:98.9 °F (37.2 °C)      Intake/Output Summary (Last 24 hours) at 2020 1637  Last data filed at 2020 1352  Gross per 24 hour   Intake 510 ml   Output 1000 ml   Net -490 ml       EXAM:  General:  Comfortable, no distress    HEENT:  Atraumatic skull, pupils equal  Lungs:   No abnormal audible breath sounds. Speaking in complete sentences  Heart:   No abnormal audible heart sounds. Well perfused  Abdomen:   Nondistended, nontender. No mass, guarding or rebound  Musc:   No skeletal defects or deformities  Neurologic:   Cranial nerves grossly intact, moves all 4 extremities  Psych:    Good insight. Not anxious nor agitated  Derm:   No rash, jaundice, nor palpable dermatologic mass    Lab Data Reviewed:   Recent Labs     20  0512 20  1317 20  0847   WBC 9.6  --  14.0*   HGB 8.1* 7.5* 8.0*   HCT 25.9* 24.8* 26.7*   *  --  604*     Recent Labs     20  0512 20  0847    137   K 3.8 3.6   * 103   CO2 26 30   * 192*   BUN 7 12   CREA 0.45* 0.79   CA 8.1* 8.3*   ALB  --  2.2*   TBILI  --  0.4   ALT  --  15     Lab Results   Component Value Date/Time    Glucose (POC) 109 (H) 2013 02:36 PM     No results for input(s): PH, PCO2, PO2, HCO3, FIO2 in the last 72 hours. No results for input(s): INR, INREXT in the last 72 hours.         Assessment:   (See above)  Principal Problem:    GI bleed (2013)    Active Problems:    Depression (2013)      Ulcerative colitis ([address] (2020)      Anemia (2020)      GERD (gastroesophageal reflux disease) (2020)      Leukocytosis (2020)      Hyperglycemia (2020)        Plan:   (See above)      Signed By: Enoc Nelson Ivonne Ortiz MD     7/6/2020  4:37 PM

## 2020-07-06 NOTE — PROGRESS NOTES
Lasha Cortez [facility]  [address] [facility], [address] [place] [address]  [phone]      Medical Progress Note      NAME: Wendy Dumont   :  1950  MRM:  [id number]    Date of service: 2020  8:32 AM       Assessment and Plan:   1. Hematochezia/diarrhea/history of ulcerative colitis. Check stool for C. difficile, WBC, enteric bacteria. Keep n.p.o. after midnight. Had colonoscopy by Dr. Mikael Padilla in January of this year, which was unremarkable. Continue IV steroids. Continue IV fluids and monitor clinically.     2. Acute on chronic anemia secondary to GI bleed due to ulcerative colitis. s/p one unit of PRBC transfusion. Continue to monitor H&H and transfuse as needed     3. GERD (gastroesophageal reflux disease) (2020). Continue PPI     4. Depression (2013). Continue home medication     5.  Leukocytosis (2020). Doubt infection. Most likely secondary to steroids. Her UA and chest x-ray are unremarkable.      6. Hyperglycemia (2020). Likely due to steroid patient. Denies diabetes mellitus. Check A1c(although only note helpful in the setting of anemia) 6.2     7. Tachycardia, intermittent/ Hx of SVT. Follows with her cardiologist. Consult cardiology        Subjective:     Chief Complaint[de-identified] Patient was seen and examined as a follow up for hematochezia. Chart was reviewed. still has bloody diarrhea     ROS:  (bold if positive, if negative)    Tolerating PT  Tolerating Diet        Objective:     Last 24hrs VS reviewed since prior progress note.  Most recent are:    Visit Vitals  /70 (BP 1 Location: Right arm, BP Patient Position: At rest)   Pulse (!) 136   Temp 97.8 °F (36.6 °C)   Resp 18   Ht 5' 6\" (1.676 m)   Wt 69.9 kg (154 lb)   SpO2 97%   Breastfeeding No   BMI 24.86 kg/m²     SpO2 Readings from Last 6 Encounters:   20 97%   20 96%   20 100%   18 100%   16 97%   13 98%            Intake/Output Summary (Last 24 hours) at 7/6/2020 9155  Last data filed at 7/6/2020 0254  Gross per 24 hour   Intake 620 ml   Output 800 ml   Net -180 ml        Physical Exam:    Gen:  Well-developed, well-nourished, in no acute distress  HEENT:  Pink conjunctivae, PERRL, hearing intact to voice, moist mucous membranes  Neck:  Supple, without masses, thyroid non-tender  Resp:  No accessory muscle use, clear breath sounds without wheezes rales or rhonchi  Card:  No murmurs, normal S1, S2 without thrills, bruits or peripheral edema  Abd:  Soft, non-tender, non-distended, normoactive bowel sounds are present, no palpable organomegaly and no detectable hernias  Lymph:  No cervical or inguinal adenopathy  Musc:  No cyanosis or clubbing  Skin:  No rashes or ulcers, skin turgor is good  Neuro:  Cranial nerves are grossly intact, no focal motor weakness, follows commands appropriately  Psych:  Good insight, oriented to person, place and time, alert  __________________________________________________________________  Medications Reviewed: (see below)  Medications:     Current Facility-Administered Medications   Medication Dose Route Frequency    sodium chloride (NS) flush 5-40 mL  5-40 mL IntraVENous Q8H    sodium chloride (NS) flush 5-40 mL  5-40 mL IntraVENous PRN    0.9% sodium chloride infusion  75 mL/hr IntraVENous CONTINUOUS    methylPREDNISolone (PF) (SOLU-MEDROL) injection 30 mg  30 mg IntraVENous Q12H    atorvastatin (LIPITOR) tablet 20 mg  20 mg Oral QHS    zolpidem (AMBIEN) tablet 5 mg  5 mg Oral QHS PRN        Lab Data Reviewed: (see below)  Lab Review:     Recent Labs     07/06/20  0512 07/05/20  1317 07/05/20  0847   WBC 9.6  --  14.0*   HGB 8.1* 7.5* 8.0*   HCT 25.9* 24.8* 26.7*   *  --  604*     Recent Labs     07/06/20  0512 07/05/20  0847    137   K 3.8 3.6   * 103   CO2 26 30   * 192*   BUN 7 12   CREA 0.45* 0.79   CA 8.1* 8.3*   ALB  --  2.2*   TBILI  --  0.4   ALT  --  15     Lab Results   Component Value Date/Time    Glucose (POC) 109 (H) 07/17/2013 02:36 PM     No results for input(s): PH, PCO2, PO2, HCO3, FIO2 in the last 72 hours. No results for input(s): INR, INREXT in the last 72 hours. All Micro Results     Procedure Component Value Units Date/Time    ENTERIC BACTERIA PANEL, DNA [[id number]] Collected:  07/05/20 1810    Order Status:  Completed Specimen:  Stool Updated:  07/05/20 1824    C. DIFFICILE AG & TOXIN A/B [[id number]] Collected:  07/05/20 1810    Order Status:  Completed Specimen:  Stool Updated:  07/05/20 1823    URINE CULTURE HOLD SAMPLE [[id number]] Collected:  07/05/20 1209    Order Status:  Completed Specimen:  Urine from Serum Updated:  07/05/20 1215     Urine culture hold       Urine on hold in Microbiology dept for 2 days. If unpreserved urine is submitted, it cannot be used for addtional testing after 24 hours, recollection will be required. C. DIFFICILE AG & TOXIN A/B [[id number]]     Order Status:  Canceled Specimen:  Stool           I have reviewed notes of prior 24hr. Other pertinent lab:      Total time spent with patient: [address] discussed with: Patient, Nursing Staff and >50% of time spent in counseling and coordination of care    Discussed:  Care Plan    Prophylaxis:  SCD's    Disposition:  Home w/Family           ___________________________________________________    Attending Physician: Otf Barnett MD

## 2020-07-06 NOTE — PROGRESS NOTES
Bedside shift change report given to Evie Hill (oncoming nurse) by Jose Guadalupe Marie (offgoing nurse). Report included the following information SBAR, Kardex, ED Summary, Intake/Output, MAR, Recent Results and Cardiac Rhythm Sinus arrythmia. 1320 Stool sample C diff positive. Dr Naseem Velasquez notified.

## 2020-07-06 NOTE — DISCHARGE INSTRUCTIONS
ACUTE DIAGNOSES:  GI bleed [K92.2]  GI bleed [K92.2]    CHRONIC MEDICAL DIAGNOSES:  Problem List as of 7/6/2020 Date Reviewed: 7/18/2013          Codes Class Noted - Resolved    C. difficile diarrhea ICD-10-CM: A04.72  ICD-9-CM: 008.45  7/6/2020 - Present        Ulcerative colitis ([facility] ICD-10-CM: K51.90  ICD-9-CM: 556.9  7/5/2020 - Present        Anemia ICD-10-CM: D64.9  ICD-9-CM: 285.9  7/5/2020 - Present        GERD (gastroesophageal reflux disease) ICD-10-CM: K21.9  ICD-9-CM: 530.81  7/5/2020 - Present        Leukocytosis ICD-10-CM: D72.829  ICD-9-CM: 288.60  7/5/2020 - Present        Hyperglycemia ICD-10-CM: R73.9  ICD-9-CM: 790.29  7/5/2020 - Present        Syncope and collapse ICD-10-CM: R55  ICD-9-CM: 780.2  7/17/2013 - Present        * (Principal) GI bleed ICD-10-CM: K92.2  ICD-9-CM: 578.9  7/17/2013 - Present        Ulcerative proctitis (HCC) (Chronic) ICD-10-CM: K51.20  ICD-9-CM: 556.2  7/17/2013 - Present        Depression (Chronic) ICD-10-CM: F32.9  ICD-9-CM: 891  7/17/2013 - Present        Atrial tachycardia ([facility] ICD-10-CM: I47.1  ICD-9-CM: 427.89  Unknown - Present        Palpitations ICD-10-CM: R00.2  ICD-9-CM: 785.1  Unknown - Present        Arrhythmia Atrial Fibrillation ICD-10-CM: I48.91  ICD-9-CM: 427.31  11/30/2010 - Present              DISCHARGE MEDICATIONS:         · It is important that you take the medication exactly as they are prescribed. · Keep your medication in the bottles provided by the pharmacist and keep a list of the medication names, dosages, and times to be taken in your wallet. · Do not take other medications without consulting your doctor.        DIET:  Regular Diet    ACTIVITY: Activity as tolerated    ADDITIONAL INFORMATION: If you experience any of the following symptoms then please call your primary care physician or return to the emergency room if you cannot get hold of your doctor: Fever, chills, nausea, vomiting, diarrhea, change in mentation, falling, bleeding, shortness of breath. FOLLOW UP CARE:  Dr. Tatyana Thomas, DO  you are to call and set up an appointment to see them in 5 days. Follow-up with Dr Himanshu Valenzuela obtained by :  I understand that if any problems occur once I am at home I am to contact my physician. I understand and acknowledge receipt of the instructions indicated above.                                                                                                                                            Physician's or R.N.'s Signature                                                                  Date/Time                                                                                                                                              Patient or Representative Signature                                                          Date/Time

## 2020-12-10 ENCOUNTER — TRANSCRIBE ORDER (OUTPATIENT)
Dept: SCHEDULING | Age: 70
End: 2020-12-10

## 2020-12-10 DIAGNOSIS — M47.814: Primary | ICD-10-CM

## 2020-12-28 ENCOUNTER — HOSPITAL ENCOUNTER (OUTPATIENT)
Dept: MRI IMAGING | Age: 70
Discharge: HOME | End: 2020-12-28
Attending: NURSE PRACTITIONER
Payer: MEDICARE

## 2020-12-28 DIAGNOSIS — M47.814: ICD-10-CM

## 2020-12-28 PROCEDURE — 72146 MRI CHEST SPINE W/O DYE: CPT

## 2021-06-30 ENCOUNTER — ANESTHESIA EVENT (OUTPATIENT)
Dept: SURGERY | Age: 71
DRG: 853 | End: 2021-06-30
Payer: MEDICARE

## 2021-06-30 ENCOUNTER — ANESTHESIA (OUTPATIENT)
Dept: SURGERY | Age: 71
DRG: 853 | End: 2021-06-30
Payer: MEDICARE

## 2021-06-30 ENCOUNTER — HOSPITAL ENCOUNTER (INPATIENT)
Age: 71
LOS: 6 days | Discharge: HOME | DRG: 853 | End: 2021-07-06
Attending: STUDENT IN AN ORGANIZED HEALTH CARE EDUCATION/TRAINING PROGRAM | Admitting: SURGERY
Payer: MEDICARE

## 2021-06-30 ENCOUNTER — APPOINTMENT (OUTPATIENT)
Dept: GENERAL RADIOLOGY | Age: 71
DRG: 853 | End: 2021-06-30
Attending: ANESTHESIOLOGY
Payer: MEDICARE

## 2021-06-30 ENCOUNTER — APPOINTMENT (OUTPATIENT)
Dept: CT IMAGING | Age: 71
DRG: 853 | End: 2021-06-30
Attending: EMERGENCY MEDICINE
Payer: MEDICARE

## 2021-06-30 DIAGNOSIS — K35.20 ACUTE APPENDICITIS WITH PERFORATION AND GENERALIZED PERITONITIS, WITHOUT ABSCESS OR GANGRENE: Primary | ICD-10-CM

## 2021-06-30 DIAGNOSIS — R79.89 ELEVATED LACTIC ACID LEVEL: ICD-10-CM

## 2021-06-30 DIAGNOSIS — I95.9 HYPOTENSION, UNSPECIFIED HYPOTENSION TYPE: ICD-10-CM

## 2021-06-30 PROBLEM — K35.32 PERFORATED APPENDICITIS: Status: ACTIVE | Noted: 2021-06-30

## 2021-06-30 LAB
ALBUMIN SERPL-MCNC: 3.5 G/DL (ref 3.5–5)
ALBUMIN/GLOB SERPL: 1 {RATIO} (ref 1.1–2.2)
ALP SERPL-CCNC: 82 U/L (ref 45–117)
ALT SERPL-CCNC: 23 U/L (ref 12–78)
ANION GAP SERPL CALC-SCNC: 8 MMOL/L (ref 5–15)
APPEARANCE UR: ABNORMAL
AST SERPL-CCNC: 15 U/L (ref 15–37)
BACTERIA URNS QL MICRO: NEGATIVE /HPF
BASOPHILS # BLD: 0 K/UL (ref 0–0.1)
BASOPHILS NFR BLD: 0 % (ref 0–1)
BILIRUB SERPL-MCNC: 1.1 MG/DL (ref 0.2–1)
BILIRUB UR QL CFM: NEGATIVE
BUN SERPL-MCNC: 17 MG/DL (ref 6–20)
BUN/CREAT SERPL: 18 (ref 12–20)
CALCIUM SERPL-MCNC: 8.9 MG/DL (ref 8.5–10.1)
CHLORIDE SERPL-SCNC: 100 MMOL/L (ref 97–108)
CO2 SERPL-SCNC: 26 MMOL/L (ref 21–32)
COLOR UR: ABNORMAL
COMMENT, HOLDF: NORMAL
CREAT SERPL-MCNC: 0.92 MG/DL (ref 0.55–1.02)
DIFFERENTIAL METHOD BLD: ABNORMAL
EOSINOPHIL # BLD: 0 K/UL (ref 0–0.4)
EOSINOPHIL NFR BLD: 0 % (ref 0–7)
EPITH CASTS URNS QL MICRO: ABNORMAL /LPF
ERYTHROCYTE [DISTWIDTH] IN BLOOD BY AUTOMATED COUNT: 14.3 % (ref 11.5–14.5)
GLOBULIN SER CALC-MCNC: 3.6 G/DL (ref 2–4)
GLUCOSE SERPL-MCNC: 229 MG/DL (ref 65–100)
GLUCOSE UR STRIP.AUTO-MCNC: NEGATIVE MG/DL
HCT VFR BLD AUTO: 36.7 % (ref 35–47)
HGB BLD-MCNC: 12.3 G/DL (ref 11.5–16)
HGB UR QL STRIP: NEGATIVE
HYALINE CASTS URNS QL MICRO: ABNORMAL /LPF (ref 0–5)
IMM GRANULOCYTES # BLD AUTO: 0.2 K/UL (ref 0–0.04)
IMM GRANULOCYTES NFR BLD AUTO: 1 % (ref 0–0.5)
KETONES UR QL STRIP.AUTO: ABNORMAL MG/DL
LACTATE BLD-SCNC: 2.53 MMOL/L (ref 0.4–2)
LACTATE SERPL-SCNC: 1.5 MMOL/L (ref 0.4–2)
LEUKOCYTE ESTERASE UR QL STRIP.AUTO: NEGATIVE
LIPASE SERPL-CCNC: 11 U/L (ref 73–393)
LYMPHOCYTES # BLD: 0.7 K/UL (ref 0.8–3.5)
LYMPHOCYTES NFR BLD: 4 % (ref 12–49)
MCH RBC QN AUTO: 31.5 PG (ref 26–34)
MCHC RBC AUTO-ENTMCNC: 33.5 G/DL (ref 30–36.5)
MCV RBC AUTO: 94.1 FL (ref 80–99)
MONOCYTES # BLD: 0.5 K/UL (ref 0–1)
MONOCYTES NFR BLD: 3 % (ref 5–13)
NEUTS SEG # BLD: 15.4 K/UL (ref 1.8–8)
NEUTS SEG NFR BLD: 92 % (ref 32–75)
NITRITE UR QL STRIP.AUTO: NEGATIVE
NRBC # BLD: 0 K/UL (ref 0–0.01)
NRBC BLD-RTO: 0 PER 100 WBC
PH UR STRIP: 5 [PH] (ref 5–8)
PLATELET # BLD AUTO: 198 K/UL (ref 150–400)
PMV BLD AUTO: 10.3 FL (ref 8.9–12.9)
POTASSIUM SERPL-SCNC: 3.6 MMOL/L (ref 3.5–5.1)
PROT SERPL-MCNC: 7.1 G/DL (ref 6.4–8.2)
PROT UR STRIP-MCNC: ABNORMAL MG/DL
RBC # BLD AUTO: 3.9 M/UL (ref 3.8–5.2)
RBC #/AREA URNS HPF: ABNORMAL /HPF (ref 0–5)
RBC MORPH BLD: ABNORMAL
SAMPLES BEING HELD,HOLD: NORMAL
SODIUM SERPL-SCNC: 134 MMOL/L (ref 136–145)
SP GR UR REFRACTOMETRY: 1.03 (ref 1–1.03)
UR CULT HOLD, URHOLD: NORMAL
UROBILINOGEN UR QL STRIP.AUTO: 0.2 EU/DL (ref 0.2–1)
WBC # BLD AUTO: 16.8 K/UL (ref 3.6–11)
WBC URNS QL MICRO: ABNORMAL /HPF (ref 0–4)

## 2021-06-30 PROCEDURE — 76010000149 HC OR TIME 1 TO 1.5 HR: Performed by: SURGERY

## 2021-06-30 PROCEDURE — 74011000258 HC RX REV CODE- 258: Performed by: NURSE ANESTHETIST, CERTIFIED REGISTERED

## 2021-06-30 PROCEDURE — 96365 THER/PROPH/DIAG IV INF INIT: CPT

## 2021-06-30 PROCEDURE — 77030008756 HC TU IRR SUC STRY -B: Performed by: SURGERY

## 2021-06-30 PROCEDURE — 77030027876 HC STPLR ENDOSC FLX PWR J&J -G1: Performed by: SURGERY

## 2021-06-30 PROCEDURE — 77030012770 HC TRCR OPT FX AMR -B: Performed by: SURGERY

## 2021-06-30 PROCEDURE — 74011000250 HC RX REV CODE- 250

## 2021-06-30 PROCEDURE — 77030010513 HC APPL CLP LIG J&J -C: Performed by: SURGERY

## 2021-06-30 PROCEDURE — 77030009851 HC PCH RTVR ENDOSC AMR -B: Performed by: SURGERY

## 2021-06-30 PROCEDURE — 65270000029 HC RM PRIVATE

## 2021-06-30 PROCEDURE — 83690 ASSAY OF LIPASE: CPT

## 2021-06-30 PROCEDURE — 77030008684 HC TU ET CUF COVD -B: Performed by: ANESTHESIOLOGY

## 2021-06-30 PROCEDURE — 96361 HYDRATE IV INFUSION ADD-ON: CPT

## 2021-06-30 PROCEDURE — 94660 CPAP INITIATION&MGMT: CPT

## 2021-06-30 PROCEDURE — 77030037032 HC INSRT SCIS CLICKLLINE DISP STOR -B: Performed by: SURGERY

## 2021-06-30 PROCEDURE — 88304 TISSUE EXAM BY PATHOLOGIST: CPT

## 2021-06-30 PROCEDURE — 74177 CT ABD & PELVIS W/CONTRAST: CPT

## 2021-06-30 PROCEDURE — 85025 COMPLETE CBC W/AUTO DIFF WBC: CPT

## 2021-06-30 PROCEDURE — 71045 X-RAY EXAM CHEST 1 VIEW: CPT

## 2021-06-30 PROCEDURE — 83605 ASSAY OF LACTIC ACID: CPT

## 2021-06-30 PROCEDURE — 74011250636 HC RX REV CODE- 250/636: Performed by: EMERGENCY MEDICINE

## 2021-06-30 PROCEDURE — 2709999900 HC NON-CHARGEABLE SUPPLY: Performed by: SURGERY

## 2021-06-30 PROCEDURE — 74011000258 HC RX REV CODE- 258: Performed by: EMERGENCY MEDICINE

## 2021-06-30 PROCEDURE — 74011000250 HC RX REV CODE- 250: Performed by: SURGERY

## 2021-06-30 PROCEDURE — 77030012405 HC DRN WND ADLR -A: Performed by: SURGERY

## 2021-06-30 PROCEDURE — 74011000250 HC RX REV CODE- 250: Performed by: NURSE ANESTHETIST, CERTIFIED REGISTERED

## 2021-06-30 PROCEDURE — 77030010507 HC ADH SKN DERMBND J&J -B: Performed by: SURGERY

## 2021-06-30 PROCEDURE — 74011250636 HC RX REV CODE- 250/636: Performed by: NURSE ANESTHETIST, CERTIFIED REGISTERED

## 2021-06-30 PROCEDURE — 77030035029 HC NDL INSUF VERES DISP COVD -B: Performed by: SURGERY

## 2021-06-30 PROCEDURE — 74011000636 HC RX REV CODE- 636: Performed by: RADIOLOGY

## 2021-06-30 PROCEDURE — 77030031139 HC SUT VCRL2 J&J -A: Performed by: SURGERY

## 2021-06-30 PROCEDURE — 76210000001 HC OR PH I REC 2.5 TO 3 HR: Performed by: SURGERY

## 2021-06-30 PROCEDURE — 77030020829: Performed by: SURGERY

## 2021-06-30 PROCEDURE — 77030034154 HC SHR COAG HARM ACE J&J -F: Performed by: SURGERY

## 2021-06-30 PROCEDURE — 0DTJ4ZZ RESECTION OF APPENDIX, PERCUTANEOUS ENDOSCOPIC APPROACH: ICD-10-PCS | Performed by: SURGERY

## 2021-06-30 PROCEDURE — 77030018836 HC SOL IRR NACL ICUM -A: Performed by: SURGERY

## 2021-06-30 PROCEDURE — 76060000033 HC ANESTHESIA 1 TO 1.5 HR: Performed by: SURGERY

## 2021-06-30 PROCEDURE — 74011250636 HC RX REV CODE- 250/636: Performed by: SURGERY

## 2021-06-30 PROCEDURE — 77030008608 HC TRCR ENDOSC SMTH AMR -B: Performed by: SURGERY

## 2021-06-30 PROCEDURE — 5A09357 ASSISTANCE WITH RESPIRATORY VENTILATION, LESS THAN 24 CONSECUTIVE HOURS, CONTINUOUS POSITIVE AIRWAY PRESSURE: ICD-10-PCS | Performed by: SURGERY

## 2021-06-30 PROCEDURE — 77030036731 HC STPLR ENDOSC J&J -F: Performed by: SURGERY

## 2021-06-30 PROCEDURE — 80053 COMPREHEN METABOLIC PANEL: CPT

## 2021-06-30 PROCEDURE — 81001 URINALYSIS AUTO W/SCOPE: CPT

## 2021-06-30 PROCEDURE — 77030040506 HC DRN WND MDII -A: Performed by: SURGERY

## 2021-06-30 PROCEDURE — 87040 BLOOD CULTURE FOR BACTERIA: CPT

## 2021-06-30 PROCEDURE — 36415 COLL VENOUS BLD VENIPUNCTURE: CPT

## 2021-06-30 PROCEDURE — 99284 EMERGENCY DEPT VISIT MOD MDM: CPT

## 2021-06-30 PROCEDURE — 77030026438 HC STYL ET INTUB CARD -A: Performed by: ANESTHESIOLOGY

## 2021-06-30 RX ORDER — CEFAZOLIN SODIUM 1 G/3ML
INJECTION, POWDER, FOR SOLUTION INTRAMUSCULAR; INTRAVENOUS AS NEEDED
Status: DISCONTINUED | OUTPATIENT
Start: 2021-06-30 | End: 2021-06-30 | Stop reason: HOSPADM

## 2021-06-30 RX ORDER — SUCCINYLCHOLINE CHLORIDE 20 MG/ML
INJECTION INTRAMUSCULAR; INTRAVENOUS AS NEEDED
Status: DISCONTINUED | OUTPATIENT
Start: 2021-06-30 | End: 2021-06-30 | Stop reason: HOSPADM

## 2021-06-30 RX ORDER — METHYLDOPA 500 MG
1 TABLET ORAL
COMMUNITY

## 2021-06-30 RX ORDER — FENTANYL CITRATE 50 UG/ML
INJECTION, SOLUTION INTRAMUSCULAR; INTRAVENOUS AS NEEDED
Status: DISCONTINUED | OUTPATIENT
Start: 2021-06-30 | End: 2021-06-30 | Stop reason: HOSPADM

## 2021-06-30 RX ORDER — ENOXAPARIN SODIUM 100 MG/ML
40 INJECTION SUBCUTANEOUS EVERY 24 HOURS
Status: DISCONTINUED | OUTPATIENT
Start: 2021-06-30 | End: 2021-07-06 | Stop reason: HOSPADM

## 2021-06-30 RX ORDER — PROPOFOL 10 MG/ML
INJECTION, EMULSION INTRAVENOUS AS NEEDED
Status: DISCONTINUED | OUTPATIENT
Start: 2021-06-30 | End: 2021-06-30 | Stop reason: HOSPADM

## 2021-06-30 RX ORDER — SODIUM CHLORIDE 0.9 % (FLUSH) 0.9 %
5-40 SYRINGE (ML) INJECTION EVERY 8 HOURS
Status: DISCONTINUED | OUTPATIENT
Start: 2021-06-30 | End: 2021-07-06 | Stop reason: HOSPADM

## 2021-06-30 RX ORDER — MORPHINE SULFATE 4 MG/ML
4 INJECTION INTRAVENOUS
Status: COMPLETED | OUTPATIENT
Start: 2021-06-30 | End: 2021-06-30

## 2021-06-30 RX ORDER — HYDROMORPHONE HYDROCHLORIDE 1 MG/ML
.25-1 INJECTION, SOLUTION INTRAMUSCULAR; INTRAVENOUS; SUBCUTANEOUS
Status: DISCONTINUED | OUTPATIENT
Start: 2021-06-30 | End: 2021-07-01 | Stop reason: HOSPADM

## 2021-06-30 RX ORDER — FOLIC ACID 1 MG/1
1 TABLET ORAL DAILY
COMMUNITY

## 2021-06-30 RX ORDER — SODIUM CHLORIDE, SODIUM LACTATE, POTASSIUM CHLORIDE, CALCIUM CHLORIDE 600; 310; 30; 20 MG/100ML; MG/100ML; MG/100ML; MG/100ML
100 INJECTION, SOLUTION INTRAVENOUS CONTINUOUS
Status: DISCONTINUED | OUTPATIENT
Start: 2021-06-30 | End: 2021-07-01 | Stop reason: HOSPADM

## 2021-06-30 RX ORDER — ESMOLOL HYDROCHLORIDE 10 MG/ML
INJECTION INTRAVENOUS AS NEEDED
Status: DISCONTINUED | OUTPATIENT
Start: 2021-06-30 | End: 2021-06-30 | Stop reason: HOSPADM

## 2021-06-30 RX ORDER — NEOSTIGMINE METHYLSULFATE 1 MG/ML
INJECTION INTRAVENOUS AS NEEDED
Status: DISCONTINUED | OUTPATIENT
Start: 2021-06-30 | End: 2021-06-30 | Stop reason: HOSPADM

## 2021-06-30 RX ORDER — BUPIVACAINE HYDROCHLORIDE 5 MG/ML
INJECTION, SOLUTION EPIDURAL; INTRACAUDAL AS NEEDED
Status: DISCONTINUED | OUTPATIENT
Start: 2021-06-30 | End: 2021-06-30 | Stop reason: HOSPADM

## 2021-06-30 RX ORDER — ACETAMINOPHEN 500 MG
1000 TABLET ORAL 2 TIMES DAILY
COMMUNITY

## 2021-06-30 RX ORDER — METOPROLOL TARTRATE 5 MG/5ML
INJECTION INTRAVENOUS
Status: COMPLETED
Start: 2021-06-30 | End: 2021-06-30

## 2021-06-30 RX ORDER — SODIUM CHLORIDE, SODIUM LACTATE, POTASSIUM CHLORIDE, CALCIUM CHLORIDE 600; 310; 30; 20 MG/100ML; MG/100ML; MG/100ML; MG/100ML
75 INJECTION, SOLUTION INTRAVENOUS CONTINUOUS
Status: DISCONTINUED | OUTPATIENT
Start: 2021-06-30 | End: 2021-07-01

## 2021-06-30 RX ORDER — ONDANSETRON 2 MG/ML
4 INJECTION INTRAMUSCULAR; INTRAVENOUS
Status: DISCONTINUED | OUTPATIENT
Start: 2021-06-30 | End: 2021-07-06 | Stop reason: HOSPADM

## 2021-06-30 RX ORDER — FAMOTIDINE 10 MG/ML
INJECTION INTRAVENOUS AS NEEDED
Status: DISCONTINUED | OUTPATIENT
Start: 2021-06-30 | End: 2021-06-30 | Stop reason: HOSPADM

## 2021-06-30 RX ORDER — SODIUM CHLORIDE 0.9 % (FLUSH) 0.9 %
5-40 SYRINGE (ML) INJECTION AS NEEDED
Status: DISCONTINUED | OUTPATIENT
Start: 2021-06-30 | End: 2021-07-01

## 2021-06-30 RX ORDER — MELATONIN
1000 DAILY
COMMUNITY

## 2021-06-30 RX ORDER — ONDANSETRON 2 MG/ML
INJECTION INTRAMUSCULAR; INTRAVENOUS AS NEEDED
Status: DISCONTINUED | OUTPATIENT
Start: 2021-06-30 | End: 2021-06-30 | Stop reason: HOSPADM

## 2021-06-30 RX ORDER — HYDROMORPHONE HYDROCHLORIDE 2 MG/ML
INJECTION, SOLUTION INTRAMUSCULAR; INTRAVENOUS; SUBCUTANEOUS AS NEEDED
Status: DISCONTINUED | OUTPATIENT
Start: 2021-06-30 | End: 2021-06-30 | Stop reason: HOSPADM

## 2021-06-30 RX ORDER — TRAMADOL HYDROCHLORIDE 50 MG/1
50 TABLET ORAL
COMMUNITY

## 2021-06-30 RX ORDER — OXYCODONE HYDROCHLORIDE 5 MG/1
5 TABLET ORAL
Status: DISCONTINUED | OUTPATIENT
Start: 2021-06-30 | End: 2021-07-04

## 2021-06-30 RX ORDER — ROCURONIUM BROMIDE 10 MG/ML
INJECTION, SOLUTION INTRAVENOUS AS NEEDED
Status: DISCONTINUED | OUTPATIENT
Start: 2021-06-30 | End: 2021-06-30 | Stop reason: HOSPADM

## 2021-06-30 RX ORDER — METHOTREXATE 2.5 MG/1
20 TABLET ORAL
COMMUNITY

## 2021-06-30 RX ORDER — ACETAMINOPHEN 325 MG/1
650 TABLET ORAL
Status: DISCONTINUED | OUTPATIENT
Start: 2021-06-30 | End: 2021-07-06 | Stop reason: HOSPADM

## 2021-06-30 RX ORDER — LIDOCAINE HYDROCHLORIDE 20 MG/ML
INJECTION, SOLUTION EPIDURAL; INFILTRATION; INTRACAUDAL; PERINEURAL AS NEEDED
Status: DISCONTINUED | OUTPATIENT
Start: 2021-06-30 | End: 2021-06-30 | Stop reason: HOSPADM

## 2021-06-30 RX ORDER — MIDAZOLAM HYDROCHLORIDE 1 MG/ML
INJECTION, SOLUTION INTRAMUSCULAR; INTRAVENOUS AS NEEDED
Status: DISCONTINUED | OUTPATIENT
Start: 2021-06-30 | End: 2021-06-30 | Stop reason: HOSPADM

## 2021-06-30 RX ORDER — MORPHINE SULFATE 2 MG/ML
2 INJECTION, SOLUTION INTRAMUSCULAR; INTRAVENOUS
Status: DISCONTINUED | OUTPATIENT
Start: 2021-06-30 | End: 2021-07-06 | Stop reason: HOSPADM

## 2021-06-30 RX ORDER — GLYCOPYRROLATE 0.2 MG/ML
INJECTION INTRAMUSCULAR; INTRAVENOUS AS NEEDED
Status: DISCONTINUED | OUTPATIENT
Start: 2021-06-30 | End: 2021-06-30 | Stop reason: HOSPADM

## 2021-06-30 RX ADMIN — PIPERACILLIN AND TAZOBACTAM 3.38 G: 3; .375 INJECTION, POWDER, LYOPHILIZED, FOR SOLUTION INTRAVENOUS at 11:20

## 2021-06-30 RX ADMIN — ROCURONIUM BROMIDE 10 MG: 10 INJECTION INTRAVENOUS at 19:45

## 2021-06-30 RX ADMIN — SODIUM CHLORIDE 1000 ML: 9 INJECTION, SOLUTION INTRAVENOUS at 09:58

## 2021-06-30 RX ADMIN — ONDANSETRON 4 MG: 2 INJECTION INTRAMUSCULAR; INTRAVENOUS at 14:05

## 2021-06-30 RX ADMIN — ONDANSETRON HYDROCHLORIDE 4 MG: 2 SOLUTION INTRAMUSCULAR; INTRAVENOUS at 19:45

## 2021-06-30 RX ADMIN — SODIUM CHLORIDE, POTASSIUM CHLORIDE, SODIUM LACTATE AND CALCIUM CHLORIDE: 600; 310; 30; 20 INJECTION, SOLUTION INTRAVENOUS at 18:41

## 2021-06-30 RX ADMIN — SODIUM CHLORIDE, POTASSIUM CHLORIDE, SODIUM LACTATE AND CALCIUM CHLORIDE: 600; 310; 30; 20 INJECTION, SOLUTION INTRAVENOUS at 20:40

## 2021-06-30 RX ADMIN — PHENYLEPHRINE HYDROCHLORIDE 100 MCG: 10 INJECTION INTRAVENOUS at 19:56

## 2021-06-30 RX ADMIN — HYDROMORPHONE HYDROCHLORIDE 1 MG: 2 INJECTION INTRAMUSCULAR; INTRAVENOUS; SUBCUTANEOUS at 20:43

## 2021-06-30 RX ADMIN — MIDAZOLAM 2 MG: 1 INJECTION, SOLUTION INTRAMUSCULAR; INTRAVENOUS at 19:32

## 2021-06-30 RX ADMIN — MORPHINE SULFATE 4 MG: 4 INJECTION, SOLUTION INTRAMUSCULAR; INTRAVENOUS at 14:06

## 2021-06-30 RX ADMIN — ROCURONIUM BROMIDE 10 MG: 10 INJECTION INTRAVENOUS at 19:40

## 2021-06-30 RX ADMIN — FAMOTIDINE 20 MG: 10 INJECTION INTRAVENOUS at 19:45

## 2021-06-30 RX ADMIN — PROPOFOL 160 MCG: 10 INJECTION, EMULSION INTRAVENOUS at 19:41

## 2021-06-30 RX ADMIN — SODIUM CHLORIDE 1000 ML: 9 INJECTION, SOLUTION INTRAVENOUS at 11:09

## 2021-06-30 RX ADMIN — SUCCINYLCHOLINE CHLORIDE 100 MG: 20 INJECTION, SOLUTION INTRAMUSCULAR; INTRAVENOUS at 19:41

## 2021-06-30 RX ADMIN — CEFAZOLIN SODIUM 2 G: 1 POWDER, FOR SOLUTION INTRAMUSCULAR; INTRAVENOUS at 19:59

## 2021-06-30 RX ADMIN — GLYCOPYRROLATE 0.4 MG: 0.2 INJECTION INTRAMUSCULAR; INTRAVENOUS at 20:41

## 2021-06-30 RX ADMIN — SODIUM CHLORIDE, POTASSIUM CHLORIDE, SODIUM LACTATE AND CALCIUM CHLORIDE 125 ML/HR: 600; 310; 30; 20 INJECTION, SOLUTION INTRAVENOUS at 16:53

## 2021-06-30 RX ADMIN — ROCURONIUM BROMIDE 10 MG: 10 INJECTION INTRAVENOUS at 20:00

## 2021-06-30 RX ADMIN — FENTANYL CITRATE 100 MCG: 50 INJECTION, SOLUTION INTRAMUSCULAR; INTRAVENOUS at 19:39

## 2021-06-30 RX ADMIN — NEOSTIGMINE METHYLSULFATE 3 MG: 1 INJECTION, SOLUTION INTRAVENOUS at 20:41

## 2021-06-30 RX ADMIN — ESMOLOL HYDROCHLORIDE 20 MG: 100 INJECTION, SOLUTION INTRAVENOUS at 19:48

## 2021-06-30 RX ADMIN — IOPAMIDOL 100 ML: 755 INJECTION, SOLUTION INTRAVENOUS at 11:42

## 2021-06-30 RX ADMIN — LIDOCAINE HYDROCHLORIDE 100 MG: 20 INJECTION, SOLUTION EPIDURAL; INFILTRATION; INTRACAUDAL; PERINEURAL at 19:39

## 2021-06-30 RX ADMIN — METOPROLOL TARTRATE 5 MG: 5 INJECTION INTRAVENOUS at 21:20

## 2021-06-30 NOTE — ED NOTES
TRANSFER - OUT REPORT:    Verbal report given to Dayday Gould (name) on Erika Foster  being transferred to [facility] (unit) for routine progression of care       Report consisted of patients Situation, Background, Assessment and   Recommendations(SBAR). Information from the following report(s) SBAR, Kardex, ED Summary and MAR was reviewed with the receiving nurse. Lines:   Peripheral IV 06/30/21 Left Antecubital (Active)   Site Assessment Clean, dry, & intact 06/30/21 1523   Phlebitis Assessment 0 06/30/21 1523   Infiltration Assessment 0 06/30/21 1523   Dressing Status Clean, dry, & intact 06/30/21 1523   Dressing Type Tape;Transparent 06/30/21 1523   Hub Color/Line Status Flushed;Pink 06/30/21 1523   Action Taken Blood drawn 06/30/21 9718        Opportunity for questions and clarification was provided.       Patient transported with:   Transport

## 2021-06-30 NOTE — ED PROVIDER NOTES
Patient is a 27-year-old female with history of atrial tachycardia, GERD, sleep apnea, ulcerative colitis, rheumatoid arthritis, presenting to emergency department for evaluation of fever and abdominal pain with onset 3 days ago. She has been fully vaccinated for COVID-19, received a Moderna vaccination, and had a negative rapid Covid test yesterday. She states the pain began generalized and is now more concentrated in the lower abdomen, equal bilaterally. T-max 102 this morning, treated with Tylenol, last dose at 830 AM.  She takes methotrexate for her RA and UC, has been on this for the past 6 months. She states that she typically has diarrhea at baseline due to her UC, and states that her diarrhea has actually been better than normal.  She denies headache, dizziness, congestion, cough, chest pain, shortness of breath, nausea, vomiting, bloody stool, dysuria, hematuria, or any other medical complaints at this time. Denies any recent antibiotic usage. She does have a history of C. difficile 1 year ago. Denies history of prior abdominal surgery. Followed by Dr. Courtney Tran for GI. Last po: cereal at 0830 , tea at 0900 today           Past Medical History:   Diagnosis Date    Arrhythmia     a tach     Atrial tachycardia ([address]     Colitis, chronic, ulcerative ([address] 2013    GERD (gastroesophageal reflux disease)     Palpitations     Sleep apnea     has lost weight, better now.   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[facility])        Past Surgical History:   Procedure Laterality Date    BREAST SURGERY PROCEDURE UNLISTED      lumpectomy 1998    COLONOSCOPY N/A 7/24/2018    COLONOSCOPY performed by John Apple MD at [facility] COLONOSCOPY N/A 1/7/2020    COLONOSCOPY performed by Mansoor Christian MD at [address] [place] [address]         Family History:   Problem Relation Age of Onset    Other Father         lung and colon cancer    Cancer Father     Cancer Mother    Flakito Langston Diabetes Paternal Uncle        Social History     Socioeconomic History    Marital status:      Spouse name: Not on file    Number of children: Not on file    Years of education: Not on file    Highest education level: Not on file   Occupational History    Not on file   Tobacco Use    Smoking status: Former Smoker     Quit date:      Years since quittin.5    Smokeless tobacco: Never Used   Substance and Sexual Activity    Alcohol use: No    Drug use: No    Sexual activity: Not on file   Other Topics Concern    Not on file   Social History Narrative    Not on file     Social Determinants of Health     Financial Resource Strain:     Difficulty of Paying Living Expenses:    Food Insecurity:     Worried About [address] in the Last Year:     [address] [employer] in the Last Year:    Transportation Needs:     Lack of Transportation (Medical):  Lack of Transportation (Non-Medical):    Physical Activity:     Days of Exercise per Week:     Minutes of Exercise per Session:    Stress:     Feeling of Stress :    Social Connections:     Frequency of Communication with Friends and Family:     Frequency of Social Gatherings with Friends and Family:     Attends [employer] Services:     Active Member of Clubs or Organizations:     Attends Club or Organization Meetings:     Marital Status:    Intimate Partner Violence:     Fear of Current or Ex-Partner:     Emotionally Abused:     Physically Abused:     Sexually Abused: ALLERGIES: Adhesive tape-silicones and Wellbutrin [bupropion]    Review of Systems   Constitutional: Positive for fever. Negative for chills. HENT: Negative for ear pain and sore throat. Eyes: Negative for visual disturbance. Respiratory: Negative for cough and shortness of breath. Cardiovascular: Negative for chest pain. Gastrointestinal: Positive for abdominal pain and diarrhea (chronic, at baseline).  Negative for blood in stool, nausea and vomiting. Genitourinary: Negative for dysuria, flank pain and hematuria. Musculoskeletal: Positive for myalgias. Negative for back pain. Skin: Negative for color change. Neurological: Negative for dizziness and headaches. Psychiatric/Behavioral: Negative for confusion. Vitals:    06/30/21 0924   BP: 106/67   Pulse: 96   Resp: 16   Temp: 98.6 °F (37 °C)   SpO2: 94%   Weight: 72.6 kg (160 lb)   Height: 5' 6\" (1.676 m)            Physical Exam  Vitals and nursing note reviewed. Constitutional:       General: She is not in acute distress. Appearance: Normal appearance. She is not ill-appearing. HENT:      Head: Normocephalic and atraumatic. Mouth/Throat:      Pharynx: Oropharynx is clear. Eyes:      Extraocular Movements: Extraocular movements intact. Conjunctiva/sclera: Conjunctivae normal.   Cardiovascular:      Rate and Rhythm: Normal rate and regular rhythm. Pulmonary:      Effort: Pulmonary effort is normal.      Breath sounds: Normal breath sounds. Abdominal:      Palpations: Abdomen is soft. Tenderness: There is generalized abdominal tenderness. There is guarding and rebound. Negative signs include Pandey's sign and McBurney's sign. Musculoskeletal:         General: Normal range of motion. Cervical back: No rigidity. Skin:     General: Skin is warm and dry. Neurological:      General: No focal deficit present. Mental Status: She is alert and oriented to person, place, and time. Psychiatric:         Mood and Affect: Mood normal.          MDM  Number of Diagnoses or Management Options  Acute appendicitis with perforation and generalized peritonitis, without abscess or gangrene  Elevated lactic acid level  Hypotension, unspecified hypotension type  Diagnosis management comments: Patient is alert, afebrile, vitals stable. 3 days of fever, myalgias, and abdominal pain. Last antipyretic 0830 (Tylenol).  History of ulcerative colitis with chronic diarrhea, at her baseline bowel function, no bloody stool. Fully vaccinated for COVID-19, negative rapid test yesterday. On exam abdomen is soft, nondistended, with generalized tenderness with guarding and rebound in all 4 quadrants. Lab work shows leukocytosis, elevated lactate, hyperglycemia without DKA. Blood cultures pending. Urinalysis clear. CT still pending. 11:58 AM   Upon nursing evaluation, became slightly hypotensive at 95/50, afebrile with normal heart rate, second liter of IV fluids and dose of IV Zosyn given. Well-appearing. 12:56 PM  CT scan shows ruptured appendix. Patient is admitted to hospital for further work-up, observation, and management. Surgery notified and agrees, plan for lap appy. Attending ED provider is aware of patient and has had the opportunity to personally evaluate the patient, and agrees with admission and current plan. Patient informed of current management plan. All questions answered at this time. Will continue to monitor patient while in ED. Amount and/or Complexity of Data Reviewed  Decide to obtain previous medical records or to obtain history from someone other than the patient: yes  Discuss the patient with other providers: yes (ED attending Dr. Juancarlos Hale )      ED Course as of Jun 30 1217   Wed Jun 30, 2021   1027 Lactic Acid (POC)(!!): 2.53 [EP]   1038 WBC(!): 16.8 [EP]   1844 IMPRESSION  Distended appendix with periappendiceal inflammatory change and extraluminal air  bubbles compatible with ruptured appendix. Secondary inflammatory changes  involving the distal ileum.     The findings were called to Dr. Ricki Mitchell on 6/30/2021 at 12:10 PM by myself.    CT ABD PELV W CONT [EP]      ED Course User Index  [EP] SUBHA Jarquin       Procedures

## 2021-06-30 NOTE — ANESTHESIA PREPROCEDURE EVALUATION
Relevant Problems   NEUROLOGY   (+) Depression      CARDIOVASCULAR   (+) Arrhythmia Atrial Fibrillation   (+) Atrial tachycardia (HCC)      GASTROINTESTINAL   (+) GERD (gastroesophageal reflux disease)      HEMATOLOGY   (+) Anemia       Anesthetic History   No history of anesthetic complications            Review of Systems / Medical History  Patient summary reviewed, nursing notes reviewed and pertinent labs reviewed    Pulmonary        Sleep apnea: BiPAP           Neuro/Psych         Psychiatric history     Cardiovascular  Within defined limits          Dysrhythmias (paroxysmal atrial tachycardia)     Pertinent negatives: No hypertension, past MI and CAD  Exercise tolerance: >4 METS  Comments: Atrial tachycardia, no prior ablations or procedures   GI/Hepatic/Renal     GERD: well controlled      PUD     Endo/Other        Arthritis     Other Findings   Comments: UC           Physical Exam    Airway  Mallampati: II  TM Distance: 4 - 6 cm  Neck ROM: normal range of motion   Mouth opening: Normal     Cardiovascular    Rhythm: regular  Rate: normal         Dental    Dentition: Lower dentition intact and Upper dentition intact     Pulmonary  Breath sounds clear to auscultation               Abdominal         Other Findings            Anesthetic Plan    ASA: 2, emergent  Anesthesia type: general            Anesthetic plan and risks discussed with: Patient

## 2021-06-30 NOTE — PROGRESS NOTES
[facility]: MED RECONCILIATION      Information obtained from: Patient over the phone (ER room 12)    Allergies: Adhesive tape-silicones and Wellbutrin [bupropion]    Comment:  Mesalamine ER 0.375 gm capsule - Non Formulary. Patient can have this brought in from home. Will have to notify patient regarding if this medication is resumed. Prior to Admission Medications:     Medication Documentation Review Audit       Reviewed by GM GrayD (Pharmacist) on 06/30/21 at [phone]      Medication Sig Documenting Provider Last Dose Status Taking?   acetaminophen (Tylenol Extra Strength) 500 mg tablet Take 1,000 mg by mouth two (2) times a day. AM and HS Provider, Historical 6/30/2021 AM Active Yes   acyclovir (ZOVIRAX) 400 mg tablet Take 400 mg by mouth daily. Provider, Historical 6/30/2021 Active Yes   atenolol (TENORMIN) 25 mg tablet Take 25 mg by mouth daily. Provider, Historical 6/30/2021 Active Yes   atorvastatin (LIPITOR) 20 mg tablet Take 20 mg by mouth nightly. Provider, Historical 6/29/2021 Active Yes   cholecalciferol (VITAMIN D3) (1000 Units /25 mcg) tablet Take 1,000 Units by mouth daily. Provider, Historical  Active Yes   citalopram (CELEXA) 20 mg tablet Take 20 mg by mouth daily. Provider, Historical 6/30/2021 Active Yes   cyclobenzaprine (FLEXERIL) 10 mg tablet Take 10 mg by mouth nightly as needed for Muscle Spasm(s). Provider, Historical  Active Yes   ferrous sulfate ER (Slow Release Iron) 160 mg (50 mg iron) TbER tablet Take 1 Tablet by mouth nightly. Provider, Historical 2/26/3286 Active Yes   folic acid (FOLVITE) 1 mg tablet Take 1 mg by mouth daily. Provider, Historical 6/30/2021 Active Yes   infliximab (REMICADE IV) by IntraVENous route. New start on 6/23/21, currently on loading phase, next dose due 7/7/21 Provider, Historical 6/23/2021 Active Yes   mesalamine ER (APRISO) 0.375 gram capsule Take 1.5 g by mouth daily.  Provider, Historical 6/30/2021 Active Yes   methotrexate (RHEUMATREX) 2.5 mg tablet Take 20 mg by mouth every Sunday. Provider, Historical 6/27/2021 Active Yes   traMADoL (ULTRAM) 50 mg tablet Take 50 mg by mouth once as needed for Pain. Provider, Historical  Active Yes   turmeric root extract 500 mg cap Take 1 Cap by mouth daily. Provider, Historical  Active Yes   zolpidem (Ambien) 5 mg tablet Take 5 mg by mouth nightly as needed for Sleep.  Provider, Historical 6/29/2021 Active Yes                      [address] [place] [address], PHARMD   Contact: [phone]

## 2021-06-30 NOTE — PROGRESS NOTES
Spiritual Care Assessment/Progress Note  Frieda Bucio      NAME: Rl Valenzuela      MRN: [id number]  AGE: 72 y.o. SEX: female  Adventist Affiliation: Baptist   Language: English     6/30/2021     Total Time (in minutes): 35     Spiritual Assessment begun in [facility] EMERGENCY DEPT through conversation with:         [x]Patient        [x] Family    [] Friend(s)        Reason for Consult: Request by patient     Spiritual beliefs: (Please include comment if needed)     [x] Identifies with a dorota tradition:   Nondenominational       [x] Supported by a dorota community:            [] Claims no spiritual orientation:           [] Seeking spiritual identity:                [] Adheres to an individual form of spirituality:           [] Not able to assess:                           Identified resources for coping:      [] Prayer                               [] Music                  [] Guided Imagery     [x] Family/friends                 [] Pet visits     [] Devotional reading                         [] Unknown     [] Other:                                         Interventions offered during this visit: (See comments for more details)    Patient Interventions: Affirmation of emotions/emotional suffering, Affirmation of dorota, Iconic (affirming the presence of God/Higher Power), Initial/Spiritual assessment, patient floor, Prayer (actual), Prayer (assurance of)     Family/Friend(s):  Affirmation of emotions/emotional suffering, Prayer (assurance of), Prayer (actual), Affirmation of dorota, Iconic (affirming the presence of God/Higher Power)     Plan of Care:     [] Support spiritual and/or cultural needs    [] Support AMD and/or advance care planning process      [] Support grieving process   [] Coordinate Rites and/or Rituals    [] Coordination with community clergy   [] No spiritual needs identified at this time   [] Detailed Plan of Care below (See Comments)  [] Make referral to Music Therapy  [] Make referral to Pet Therapy     [] Make referral to Addiction services  [] Make referral to [facility]  [] Make referral to Spiritual Care Partner  [] No future visits requested        [x] Follow up upon further referrals     Comments: Responded to request from miss [place] [employer] [employer] for a  visit in the ER. She and her wife Nicki Peguero are known to me. Alfredo Humphreys shared her recent medical journey that brought her to the hospital.  She is waiting for surgery. Provided spiritual presence and prayer.     Visited by: Teressa Martinez., MS., [address] [facility] Z[address] ([id number])

## 2021-06-30 NOTE — H&P
Assessment:   71 yo woman with perforated appendicitis      Plan:     OR tonight for appendectomy  Discussed risks and benefits of the procedure with the patient and she would like to proceed  Admit post op for abx  NPO  IVF  IV pain medication as needed    Signed By: Vicki Marquez MD  [facility]  [phone]    2021          General Surgery History and Physical    Subjective:      Kelley Worrell is a 72 y.o.  female who presents with several day history of RLQ pain. She reports that the pain has been persistent since Monday and acutely worsened today. She denies any fevers or chills. She denies any nausea or vomiting. She is generally healthy with no previous abdominal surgery. Past Medical History:   Diagnosis Date    Arrhythmia     a tach     Arthritis     Atrial tachycardia ([address]     [address]itis, chronic, ulcerative [address]     GERD (gastroesophageal reflux disease)     Palpitations     Sleep apnea     has lost weight, better now.   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[facility])      Past Surgical History:   Procedure Laterality Date    COLONOSCOPY N/A 2018    COLONOSCOPY performed by Alexei Dao MD at [facility] COLONOSCOPY N/A 2020    COLONOSCOPY performed by Makayla Mercedes MD at [address] [place] [address] Str. UN[address]ED      lumpectomy       Family History   Problem Relation Age of Onset    Other Father         lung and colon cancer    Cancer Father     Cancer Mother     Diabetes Paternal Uncle      Social History     Socioeconomic History    Marital status:      Spouse name: Not on file    Number of children: Not on file    Years of education: Not on file    Highest education level: Not on file   Tobacco Use    Smoking status: Former Smoker     Quit date:      Years since quittin.5    Smokeless tobacco: Never Used   Substance and Sexual Activity    Alcohol use: No    Drug use: No     Social Determinants of Health     Financial Resource Strain:     Difficulty of Paying Living Expenses:    Food Insecurity:     Worried About Running Out of Food in the Last Year:     [address] in the Last Year:    Transportation Needs:     Lack of Transportation (Medical):      Lack of Transportation (Non-Medical):    Physical Activity:     Days of Exercise per Week:     Minutes of Exercise per Session:    Stress:     Feeling of Stress :    Social Connections:     Frequency of Communication with Friends and Family:     Frequency of Social Gatherings with Friends and Family:     Attends Pentecostal Services:     Active Member of Clubs or Organizations:     Attends Club or Organization Meetings:     Marital Status:       Current Facility-Administered Medications   Medication Dose Route Frequency    piperacillin-tazobactam (ZOSYN) 3.375 g in 0.9% sodium chloride (MBP/ADV) 100 mL MBP  3.375 g IntraVENous Q8H    sodium chloride (NS) flush 5-40 mL  5-40 mL IntraVENous Q8H    sodium chloride (NS) flush 5-40 mL  5-40 mL IntraVENous PRN    morphine injection 2 mg  2 mg IntraVENous Q4H PRN    oxyCODONE IR (ROXICODONE) tablet 5 mg  5 mg Oral Q4H PRN    acetaminophen (TYLENOL) tablet 650 mg  650 mg Oral Q6H PRN    ondansetron (ZOFRAN) injection 4 mg  4 mg IntraVENous Q4H PRN    enoxaparin (LOVENOX) injection 40 mg  40 mg SubCUTAneous Q24H    lactated Ringers infusion  125 mL/hr IntraVENous CONTINUOUS      Allergies   Allergen Reactions    Adhesive Tape-Silicones Contact Dermatitis    Wellbutrin [Bupropion] Rash       Review of Systems:     []     Unable to obtain  ROS due to  []    mental status change  []    sedated   []    intubated   [x]    Total of 12 system negative, unless specified below or in HPI:  Constitutional: negative fever, negative chills, negative weight loss  Eyes:   negative visual changes  ENT:   negative sore throat, tongue or lip swelling  Respiratory:  negative cough, negative dyspnea  Cards:  negative for chest pain, palpitations, lower extremity edema  GI:   negative for nausea, vomiting, diarrhea, and positive for abdominal pain  :  negative for frequency, dysuria  Integument:  negative for rash and pruritus  Heme:  negative for easy bruising and gum/nose bleeding  Musculoskel: negative for myalgias,  back pain and muscle weakness  Neuro:  negative for headaches, dizziness, vertigo  Psych:  negative for feelings of anxiety, depression     Objective:        Patient Vitals for the past 8 hrs:   BP Temp Pulse Resp SpO2   21 1648 (!) 109/56 (!) 100.5 °F (38.1 °C) 91 16 90 %   21 1541 (!) 93/52 99.8 °F (37.7 °C) 80 18 92 %   21 1445 (!) 95/49    (!) 87 %   21 1330 (!) 100/52    97 %   21 1300     98 %   21 1200 (!) 96/48    95 %   21 1042 (!) 95/50    94 %       Temp (24hrs), Av.6 °F (37.6 °C), Min:98.6 °F (37 °C), Max:100.5 °F (38.1 °C)      Physical Exam:  General:  Alert, cooperative, no distress, appears stated age. Eyes:  Conjunctivae/corneas clear. PERRL, EOMs intact. Nose: Nares normal. Septum midline. Mucosa normal. No drainage or sinus tenderness. Mouth/Throat: Lips, mucosa, and tongue normal. Teeth and gums normal.   Neck: Supple, symmetrical, trachea midline   Back:   Symmetric, no curvature. ROM normal. No CVA tenderness. Lungs:   Clear to auscultation bilaterally. Heart:  Regular rate and rhythm   Abdomen:   Soft, tender RLQ. Bowel sounds normal.    Extremities: Extremities normal, atraumatic, no cyanosis or edema.        Skin: Skin color, texture, turgor normal. No rashes or lesions         BMP:   Lab Results   Component Value Date/Time     (L) 2021 09:45 AM    K 3.6 2021 09:45 AM     2021 09:45 AM    CO2 26 2021 09:45 AM    AGAP 8 2021 09:45 AM     (H) 2021 09:45 AM    BUN 17 2021 09:45 AM CREA 0.92 06/30/2021 09:45 AM    GFRAA >60 06/30/2021 09:45 AM    GFRNA >60 06/30/2021 09:45 AM     CMP:   Lab Results   Component Value Date/Time     (L) 06/30/2021 09:45 AM    K 3.6 06/30/2021 09:45 AM     06/30/2021 09:45 AM    CO2 26 06/30/2021 09:45 AM    AGAP 8 06/30/2021 09:45 AM     (H) 06/30/2021 09:45 AM    BUN 17 06/30/2021 09:45 AM    CREA 0.92 06/30/2021 09:45 AM    GFRAA >60 06/30/2021 09:45 AM    GFRNA >60 06/30/2021 09:45 AM    CA 8.9 06/30/2021 09:45 AM    ALB 3.5 06/30/2021 09:45 AM    TP 7.1 06/30/2021 09:45 AM    GLOB 3.6 06/30/2021 09:45 AM    AGRAT 1.0 (L) 06/30/2021 09:45 AM    ALT 23 06/30/2021 09:45 AM     CBC:   Lab Results   Component Value Date/Time    WBC 16.8 (H) 06/30/2021 09:45 AM    HGB 12.3 06/30/2021 09:45 AM    HCT 36.7 06/30/2021 09:45 AM     06/30/2021 09:45 AM     All Cardiac Markers in the last 24 hours: No results found for: CPK, CK, CKMMB, CKMB, RCK3, CKMBT, CKNDX, CKND1, SACHIN, TROPT, TROIQ, NAM, TROPT, TNIPOC, BNP, BNPP  ABG: No results found for: PH, PHI, PCO2, PCO2I, PO2, PO2I, HCO3, HCO3I, FIO2, FIO2I  COAGS: No results found for: APTT, PTP, INR, INREXT, INREXT  Pancreatic Markers:   Lab Results   Component Value Date/Time    LPSE 11 (L) 06/30/2021 09:45 AM

## 2021-06-30 NOTE — PROGRESS NOTES
6/30/2021  4:27 PM  Case management ote    Reason for Admission:  Perforated appendicitis     Patient came to ED for fever and abdominal pain. Patient is , independent with ADL's and drives. [employer] [facility] [email or web address]                     RUR Score:                     Plan for utilizing home health:      Patient is independent and will most likely not qualify for home health services. PCP: First and Last name:  Ching Ramirez,      Name of Practice:    Are you a current patient: Yes/No: yes   Approximate date of last visit: 6 months   Can you participate in a virtual visit with your PCP:                     Current Advanced Directive/Advance Care Plan: Full Code has AD, not on file      Healthcare Decision Maker:   Mike Bush spouse [phone] c 5  h                             Transition of Care Plan:             1. Home with family assistance  2. PCP follow up  3. AD planning  4. CM to follow for discharge needs       5.  Surgery follow up

## 2021-06-30 NOTE — ED TRIAGE NOTES
Patient reports she started with fever, abdominal pain and body aches since Monday-     Patient reports vomiting last night but denies any today- Patient also reports chronic diarrhea    Pt took Tylenol at 0830

## 2021-07-01 PROCEDURE — 74011250637 HC RX REV CODE- 250/637: Performed by: SURGERY

## 2021-07-01 PROCEDURE — 74011000250 HC RX REV CODE- 250: Performed by: SURGERY

## 2021-07-01 PROCEDURE — 65270000029 HC RM PRIVATE

## 2021-07-01 PROCEDURE — 94660 CPAP INITIATION&MGMT: CPT

## 2021-07-01 PROCEDURE — 94760 N-INVAS EAR/PLS OXIMETRY 1: CPT

## 2021-07-01 PROCEDURE — 74011250636 HC RX REV CODE- 250/636: Performed by: SURGERY

## 2021-07-01 PROCEDURE — 74011000258 HC RX REV CODE- 258: Performed by: SURGERY

## 2021-07-01 RX ORDER — ATENOLOL 25 MG/1
25 TABLET ORAL DAILY
Status: DISCONTINUED | OUTPATIENT
Start: 2021-07-01 | End: 2021-07-06 | Stop reason: HOSPADM

## 2021-07-01 RX ORDER — ATORVASTATIN CALCIUM 20 MG/1
20 TABLET, FILM COATED ORAL
Status: DISCONTINUED | OUTPATIENT
Start: 2021-07-01 | End: 2021-07-06 | Stop reason: HOSPADM

## 2021-07-01 RX ORDER — BUMETANIDE 0.25 MG/ML
1 INJECTION INTRAMUSCULAR; INTRAVENOUS ONCE
Status: COMPLETED | OUTPATIENT
Start: 2021-07-01 | End: 2021-07-01

## 2021-07-01 RX ORDER — NALOXONE HYDROCHLORIDE 0.4 MG/ML
0.4 INJECTION, SOLUTION INTRAMUSCULAR; INTRAVENOUS; SUBCUTANEOUS
Status: DISCONTINUED | OUTPATIENT
Start: 2021-07-01 | End: 2021-07-06 | Stop reason: HOSPADM

## 2021-07-01 RX ORDER — LIDOCAINE HYDROCHLORIDE 10 MG/ML
0.1 INJECTION, SOLUTION EPIDURAL; INFILTRATION; INTRACAUDAL; PERINEURAL AS NEEDED
Status: DISCONTINUED | OUTPATIENT
Start: 2021-07-01 | End: 2021-07-01

## 2021-07-01 RX ORDER — SODIUM CHLORIDE, SODIUM LACTATE, POTASSIUM CHLORIDE, CALCIUM CHLORIDE 600; 310; 30; 20 MG/100ML; MG/100ML; MG/100ML; MG/100ML
125 INJECTION, SOLUTION INTRAVENOUS CONTINUOUS
Status: DISCONTINUED | OUTPATIENT
Start: 2021-07-01 | End: 2021-07-01

## 2021-07-01 RX ORDER — SODIUM CHLORIDE 0.9 % (FLUSH) 0.9 %
5-40 SYRINGE (ML) INJECTION EVERY 8 HOURS
Status: DISCONTINUED | OUTPATIENT
Start: 2021-07-01 | End: 2021-07-01

## 2021-07-01 RX ORDER — SODIUM CHLORIDE 0.9 % (FLUSH) 0.9 %
5-40 SYRINGE (ML) INJECTION AS NEEDED
Status: DISCONTINUED | OUTPATIENT
Start: 2021-07-01 | End: 2021-07-06 | Stop reason: HOSPADM

## 2021-07-01 RX ORDER — SODIUM CHLORIDE 9 MG/ML
25 INJECTION, SOLUTION INTRAVENOUS AS NEEDED
Status: DISCONTINUED | OUTPATIENT
Start: 2021-07-01 | End: 2021-07-06 | Stop reason: HOSPADM

## 2021-07-01 RX ORDER — CITALOPRAM 20 MG/1
20 TABLET, FILM COATED ORAL DAILY
Status: DISCONTINUED | OUTPATIENT
Start: 2021-07-01 | End: 2021-07-06 | Stop reason: HOSPADM

## 2021-07-01 RX ORDER — FLUMAZENIL 0.1 MG/ML
0.2 INJECTION INTRAVENOUS
Status: DISCONTINUED | OUTPATIENT
Start: 2021-07-01 | End: 2021-07-01

## 2021-07-01 RX ORDER — MESALAMINE 0.38 G/1
1.5 CAPSULE, EXTENDED RELEASE ORAL DAILY
Status: DISCONTINUED | OUTPATIENT
Start: 2021-07-01 | End: 2021-07-06 | Stop reason: HOSPADM

## 2021-07-01 RX ADMIN — MORPHINE SULFATE 2 MG: 2 INJECTION, SOLUTION INTRAMUSCULAR; INTRAVENOUS at 09:54

## 2021-07-01 RX ADMIN — Medication 10 ML: at 18:56

## 2021-07-01 RX ADMIN — ONDANSETRON 4 MG: 2 INJECTION INTRAMUSCULAR; INTRAVENOUS at 18:50

## 2021-07-01 RX ADMIN — PIPERACILLIN AND TAZOBACTAM 3.38 G: 3; .375 INJECTION, POWDER, LYOPHILIZED, FOR SOLUTION INTRAVENOUS at 03:10

## 2021-07-01 RX ADMIN — ONDANSETRON 4 MG: 2 INJECTION INTRAMUSCULAR; INTRAVENOUS at 22:49

## 2021-07-01 RX ADMIN — PIPERACILLIN AND TAZOBACTAM 3.38 G: 3; .375 INJECTION, POWDER, LYOPHILIZED, FOR SOLUTION INTRAVENOUS at 18:50

## 2021-07-01 RX ADMIN — PIPERACILLIN AND TAZOBACTAM 3.38 G: 3; .375 INJECTION, POWDER, LYOPHILIZED, FOR SOLUTION INTRAVENOUS at 11:57

## 2021-07-01 RX ADMIN — ENOXAPARIN SODIUM 40 MG: 40 INJECTION SUBCUTANEOUS at 01:00

## 2021-07-01 RX ADMIN — ATENOLOL 25 MG: 25 TABLET ORAL at 09:47

## 2021-07-01 RX ADMIN — Medication 10 ML: at 01:17

## 2021-07-01 RX ADMIN — ONDANSETRON 4 MG: 2 INJECTION INTRAMUSCULAR; INTRAVENOUS at 09:54

## 2021-07-01 RX ADMIN — BUMETANIDE 1 MG: 0.25 INJECTION, SOLUTION INTRAMUSCULAR; INTRAVENOUS at 11:56

## 2021-07-01 RX ADMIN — MESALAMINE 1.5 G: 0.38 CAPSULE, EXTENDED RELEASE ORAL at 12:34

## 2021-07-01 RX ADMIN — ATORVASTATIN CALCIUM 20 MG: 20 TABLET, FILM COATED ORAL at 20:42

## 2021-07-01 RX ADMIN — ENOXAPARIN SODIUM 40 MG: 40 INJECTION SUBCUTANEOUS at 20:41

## 2021-07-01 RX ADMIN — CITALOPRAM HYDROBROMIDE 20 MG: 20 TABLET ORAL at 09:47

## 2021-07-01 RX ADMIN — Medication 10 ML: at 20:42

## 2021-07-01 RX ADMIN — OXYCODONE 5 MG: 5 TABLET ORAL at 12:16

## 2021-07-01 RX ADMIN — OXYCODONE 5 MG: 5 TABLET ORAL at 22:49

## 2021-07-01 RX ADMIN — ATORVASTATIN CALCIUM 20 MG: 20 TABLET, FILM COATED ORAL at 00:59

## 2021-07-01 RX ADMIN — ACETAMINOPHEN 650 MG: 325 TABLET ORAL at 11:57

## 2021-07-01 RX ADMIN — OXYCODONE 5 MG: 5 TABLET ORAL at 18:36

## 2021-07-01 NOTE — PROGRESS NOTES
7/1/2021 2:18 PM EMR reviewed. CM will follow for discharge needs. Noted pt is on 4L of O2. JANNETTE Howell    RUR:    Risk Level: []Low []Moderate []High  Value-based purchasing: [] Yes [] No  Bundle patient: [] Yes [] No   Specify:     Transition of care plan:  1. POD 1 appendectomy, on 4L of O2, wasn't requiring supplemental O2 prior to surgery  2. Home with family at discharge, TBD on needs   3. Outpatient follow-up.   4. Pt's family to transport

## 2021-07-01 NOTE — PERIOP NOTES
RN notified Dr. Perze Rubin pt's O2 sat's were 62% with HR in 120s. Dr. Perez Rubin came to bedside to evaluate pt. Verbal order given for 1 mg of Metoprolol and pt placed on a non-rebreather mask. Pt's O2 sats increased to 90%. RN auscultated pt's lungs, lungs sounds clear. 1 mg of Metoprolol administered, pt's HR decreased to 90. Pt continued on a non-rebreather, O2 sats 89%. 2142: Dr. ePrez Rubin at bedside. Pt continued on non-rebreather at 87%. CXR ordered and incentive spirometer. RN instructed pt on how to use IS. Pt verbalized an understanding and demonstrated to RN how to use it. Pt used IS correctly and performed IS 5 more times. O2 de-sat'd to 83% after pt used IS. PT placed back on non-rebreather. 2223: RN called pt's wife Elan Boothe to give an update on pt's condition. Irene informed RN pt does currently use a CPAP at night. 2244: RT at bedside to connect pt to CPAP.

## 2021-07-01 NOTE — PROGRESS NOTES
Follow up visit on 4 post Surg. Verified with Miss Eder Gardner, that she is listed as Taoist since in previous visit I understood differently. Ivette Poag verified her Taoist belief. She was in some discomfort, provided calming presence, and assurance of prayers.     Visited by: Rigo Christina, MS., [address] ([id number])

## 2021-07-01 NOTE — PROGRESS NOTES
Problem: Falls - Risk of  Goal: *Absence of Falls  Description: Document Mellyoly Jose Danielchadwick Fall Risk and appropriate interventions in the flowsheet. Outcome: Progressing Towards Goal  Note: Fall Risk Interventions:                                Problem: Patient Education: Go to Patient Education Activity  Goal: Patient/Family Education  Outcome: Progressing Towards Goal     Problem: Pressure Injury - Risk of  Goal: *Prevention of pressure injury  Description: Document David Scale and appropriate interventions in the flowsheet.   Outcome: Progressing Towards Goal  Note: Pressure Injury Interventions:       Moisture Interventions: Limit adult briefs    Activity Interventions: Assess need for specialty bed    Mobility Interventions: HOB 30 degrees or less    Nutrition Interventions: Document food/fluid/supplement intake

## 2021-07-01 NOTE — ANESTHESIA POSTPROCEDURE EVALUATION
Procedure(s):  APPENDECTOMY LAPAROSCOPIC. general    Anesthesia Post Evaluation      Multimodal analgesia: multimodal analgesia not used between 6 hours prior to anesthesia start to PACU discharge  Patient location during evaluation: PACU  Patient participation: complete - patient participated  Level of consciousness: awake and alert  Pain score: 2  Pain management: satisfactory to patient  Airway patency: patent  Anesthetic complications: no  Cardiovascular status: acceptable  Respiratory status: face mask and nonlabored ventilation  Hydration status: acceptable  Post anesthesia nausea and vomiting:  none  Final Post Anesthesia Temperature Assessment:  Normothermia (36.0-37.5 degrees C)      INITIAL Post-op Vital signs:   Vitals Value Taken Time   BP 98/30 06/30/21 2215   Temp 36.4 °C (97.5 °F) 06/30/21 2108   Pulse 91 06/30/21 2219   Resp 18 06/30/21 2219   SpO2 91 % 06/30/21 2219   Vitals shown include unvalidated device data.

## 2021-07-01 NOTE — PROGRESS NOTES
Spiritual Care Assessment/Progress Note  [address]      NAME: Ligia Martinez      MRN: [id number]  AGE: 72 y.o. SEX: female  Mandaeism Affiliation: Methodist   Language: English     7/1/2021     Total Time (in minutes): 5     Spiritual Assessment begun in [facility] POST SURG ORT 2 through conversation with:         []Patient        [] Family    [] Friend(s)        Reason for Consult: [facility]     Spiritual beliefs: (Please include comment if needed)     [x] Identifies with a dorota tradition:   Methodist      [] Supported by a dorota community:            [] Claims no spiritual orientation:           [] Seeking spiritual identity:                [] Adheres to an individual form of spirituality:           [] Not able to assess:                           Identified resources for coping:      [x] Prayer                               [x] Music                  [] Guided Imagery     [x] Family/friends                 [] Pet visits     [] Devotional reading                         [] Unknown     [] Other:                                               Interventions offered during this visit: (See comments for more details)    Patient Interventions: Affirmation of dorota, Communion [employer]), Initial/Spiritual assessment, patient floor, Prayer (actual), Prayer (assurance of)     Family/Friend(s):  Affirmation of emotions/emotional suffering, Prayer (assurance of), Prayer (actual), Affirmation of dorota, Iconic (affirming the presence of God/Higher Power)     Plan of Care:     [x] Support spiritual and/or cultural needs    [] Support AMD and/or advance care planning process      [] Support grieving process   [] Coordinate Rites and/or Rituals    [] Coordination with community clergy   [] No spiritual needs identified at this time   [] Detailed Plan of Care below (See Comments)  [] Make referral to Music Therapy  [] Make referral to Pet Therapy     [] Make referral to Addiction services  [] Make referral to [facility]  [] Make referral to Spiritual Care Partner  [] No future visits requested        [] Follow up upon further referrals     Comments: Mrs. Garrett Omer was laying flat in bed. She declined communion today due to medical reasons. This was not a good time for a visit. Assured her of my prayer for her recovery to wellness.       TEMITOPE Castillo, RN, ACSW, LCSW   Page:  [phone]

## 2021-07-01 NOTE — PERIOP NOTES
TRANSFER - OUT REPORT:    Verbal report given to Domonique Mullen on Joey Stacey  being transferred to [facility] [id number] for routine post - op       Report consisted of patients Situation, Background, Assessment and   Recommendations(SBAR). Information from the following report(s) SBAR, OR Summary, Procedure Summary, Intake/Output, MAR, Accordion and Cardiac Rhythm NSR was reviewed with the receiving nurse. Lines:   Peripheral IV 06/30/21 Left Antecubital (Active)   Site Assessment Clean, dry, & intact 06/30/21 2110   Phlebitis Assessment 0 06/30/21 2110   Infiltration Assessment 0 06/30/21 2110   Dressing Status Clean, dry, & intact 06/30/21 2110   Dressing Type Transparent;Tape 06/30/21 2110   Hub Color/Line Status Pink;Flushed; Infusing 06/30/21 2110   Action Taken Open ports on tubing capped 06/30/21 2110   Alcohol Cap Used Yes 06/30/21 2110        Opportunity for questions and clarification was provided.       Patient transported with:   CPAP  Registered Nurse

## 2021-07-01 NOTE — PROGRESS NOTES
Bedside shift change report given to Maximo (oncoming nurse) by Hilary Ramos (offgoing nurse). Report included the following information SBAR, Kardex, OR Summary, Procedure Summary, Intake/Output, MAR, Recent Results, Pre Procedure Checklist and Quality Measures.

## 2021-07-01 NOTE — CONSULTS
Name: Franny Desai: [address]   : 1950 Admit Date: 2021   Phone: [phone]  Room: [facility]/01   PCP: Jesus Vega DO  MRN: [id number]   Date: 2021  Code: Full Code          Chart and notes reviewed. Data reviewed. I review the patient's current medications in the medical record at each encounter. I have evaluated and examined the patient. HPI:    3:59 PM       History was obtained from patient. I was asked by Kendy Duncan MD to see Ligia Martinez in consultation for a chief complaint of acute respiratory failure with hypoxia. History of Present Illness:  Ms. Nikkie Herrera is a 71 yo woman with a history of EDI (on CPAP), Ulcerative colitis, rheumatoid arthritis, atrial tachy cardia and GERD who is admitted for a perforated appendix s/p appendectomy. Noted to be hypoxic post-op. In the PACU she was noted to have sats in the 60's that improved with a NRB. She wore CPAP overnight and today has required 4L NC. CXR last night showed low lung volumes, bibasilar atelectasis and likely edema. She was given a dose of bumex today with reported response. She denies shortness of breath, cough, wheezing. She has expected post-op pain. She ambulated to the nurses station without her O2, but is not sure what her sats where at that time. She was diagnosed with EDI about 20 years ago, but was able to come off of CPAP after losing weight. She was diagnosed with EDI again about 2 months ago and has been wearing CPAP.     Labs reviewed: WBC 16.8, cr 0.92, K 3.6    Images reviewed:  CXR 2021: low lung volume, bibasilar atelectasis and increased interstitial markings likely due to edema      PMH: as per HPI    Past Surgical History:   Procedure Laterality Date    COLONOSCOPY N/A 2018    COLONOSCOPY performed by Dev Longoria MD at [facility] 112 COLONOSCOPY N/A 2020    COLONOSCOPY performed by Trevon Buckley MD at [facility] 1960    [place] BREAST SURGERY PROCEDURE UNLISTED      lumpectomy        Family History   Problem Relation Age of Onset    Other Father         lung and colon cancer    Cancer Father     Cancer Mother     Diabetes Paternal Uncle        Social History     Tobacco Use    Smoking status: Former Smoker     Quit date:      Years since quittin.5    Smokeless tobacco: Never Used   Substance Use Topics    Alcohol use: No       Allergies   Allergen Reactions    Adhesive Tape-Silicones Contact Dermatitis    Wellbutrin [Bupropion] Rash       Current Facility-Administered Medications   Medication Dose Route Frequency    atenoloL (TENORMIN) tablet 25 mg  25 mg Oral DAILY    atorvastatin (LIPITOR) tablet 20 mg  20 mg Oral QHS    citalopram (CELEXA) tablet 20 mg  20 mg Oral DAILY    mesalamine ER (APRISO) 24 hour capsule 1.5 g (Patient Supplied)  1.5 g Oral DAILY    piperacillin-tazobactam (ZOSYN) 3.375 g in 0.9% sodium chloride (MBP/ADV) 100 mL MBP  3.375 g IntraVENous Q8H    sodium chloride (NS) flush 5-40 mL  5-40 mL IntraVENous Q8H    sodium chloride (NS) flush 5-40 mL  5-40 mL IntraVENous PRN    morphine injection 2 mg  2 mg IntraVENous Q4H PRN    oxyCODONE IR (ROXICODONE) tablet 5 mg  5 mg Oral Q4H PRN    acetaminophen (TYLENOL) tablet 650 mg  650 mg Oral Q6H PRN    ondansetron (ZOFRAN) injection 4 mg  4 mg IntraVENous Q4H PRN    enoxaparin (LOVENOX) injection 40 mg  40 mg SubCUTAneous Q24H         REVIEW OF SYSTEMS   12 point ROS negative except as stated in the HPI. Physical Exam:   Visit Vitals  /69 (BP 1 Location: Right upper arm, BP Patient Position: At rest)   Pulse (!) 110   Temp 100 °F (37.8 °C)   Resp 19   Ht 5' 6\" (1.676 m)   Wt 72.6 kg (160 lb)   SpO2 90%   BMI 25.82 kg/m²       General:  Alert, cooperative, no distress, appears stated age. Head:  Normocephalic, without obvious abnormality, atraumatic. Eyes:  Conjunctivae/corneas clear.     Nose: Nares normal. Septum midline. Mucosa normal.    Throat: Lips, mucosa, and tongue normal.    Neck: Supple, symmetrical, trachea midline, no adenopathy. Lungs:   Clear to auscultation bilaterally. Respirations non-labored. Chest wall:  No tenderness or deformity. Heart:  Regular rate and rhythm, S1, S2 normal, no murmur, click, rub or gallop. Abdomen:   Soft, tender to palpation   Extremities: Extremities normal, atraumatic, no cyanosis or edema. Pulses: 2+ and symmetric all extremities. Skin: Skin color, texture, turgor normal. No rashes or lesions       Neurologic: Grossly nonfocal       Lab Results   Component Value Date/Time    Sodium 134 (L) 06/30/2021 09:45 AM    Potassium 3.6 06/30/2021 09:45 AM    Chloride 100 06/30/2021 09:45 AM    CO2 26 06/30/2021 09:45 AM    BUN 17 06/30/2021 09:45 AM    Creatinine 0.92 06/30/2021 09:45 AM    Glucose 229 (H) 06/30/2021 09:45 AM    Calcium 8.9 06/30/2021 09:45 AM    Magnesium 2.0 07/18/2013 03:03 AM    Phosphorus 2.6 07/18/2013 03:03 AM    Lactic acid 1.5 06/30/2021 04:58 PM       Lab Results   Component Value Date/Time    WBC 16.8 (H) 06/30/2021 09:45 AM    HGB 12.3 06/30/2021 09:45 AM    PLATELET 855 83/34/9793 09:45 AM    MCV 94.1 06/30/2021 09:45 AM       Lab Results   Component Value Date/Time    Alk.  phosphatase 82 06/30/2021 09:45 AM    Protein, total 7.1 06/30/2021 09:45 AM    Albumin 3.5 06/30/2021 09:45 AM    Globulin 3.6 06/30/2021 09:45 AM       No results found for: IRON, FE, TIBC, IBCT, PSAT, FERR    Lab Results   Component Value Date/Time    TSH 1.11 07/18/2013 03:03 AM        No results found for: PH, PHI, PCO2, PCO2I, PO2, PO2I, HCO3, HCO3I, FIO2, FIO2I    Lab Results   Component Value Date/Time    Troponin-I, Qt. <0.05 07/05/2020 08:47 AM        Lab Results   Component Value Date/Time    Culture result: NO GROWTH AFTER 17 HOURS 06/30/2021 11:19 AM       No results found for: TOXA1, RPR, HBCM, HBSAG, HAAB, HCAB1, HAAT, G6PD, CRYAC, HIVGT, HIVR, HIV1, HIV12, HIVPC, HIVRPI    No results found for: VANCT, CPK    Lab Results   Component Value Date/Time    Color DARK YELLOW 06/30/2021 10:00 AM    Appearance CLOUDY (A) 06/30/2021 10:00 AM    Specific gravity 1.030 07/05/2020 12:09 PM    pH (UA) 5.0 06/30/2021 10:00 AM    Protein TRACE (A) 06/30/2021 10:00 AM    Glucose Negative 06/30/2021 10:00 AM    Ketone TRACE (A) 06/30/2021 10:00 AM    Bilirubin Negative 07/05/2020 12:09 PM    Blood Negative 06/30/2021 10:00 AM    Urobilinogen 0.2 06/30/2021 10:00 AM    Nitrites Negative 06/30/2021 10:00 AM    Leukocyte Esterase Negative 06/30/2021 10:00 AM    WBC 0-4 06/30/2021 10:00 AM    RBC 0-5 06/30/2021 10:00 AM    Bacteria Negative 06/30/2021 10:00 AM       IMPRESSION  · Acute respiratory failure with hypoxia  · EDI, wears CPAP  · Perforated appendix s/p appendectomy    PLAN  · Goal sats 88%, wean O2 as tolerated  · Will need exercise oximetry closer to discharge  · Suspect this is a combination of hypoventilation immediately post-op due to EDI, atelectasis and possible edema as a response to intraabdominal process  · Discussed repeat CXR in the morning, she is concerned about the amount of radiation she has received and agreed that if she has not improved from an O2 requirement standpoint tomorrow, we will get the CXR  · Pain control  · IS, ambulation as possible  · Lovenox      Thank you for allowing us to participate in the care of this patient. We will be happy to follow along in his/her progress with you.     Steve Aguirre MD

## 2021-07-01 NOTE — PROGRESS NOTES
Patient oxygen dropped to 61 when patient removed from CPAP. Started patient on 2L NC and oxygen increase to low 80's, increased oxygen to 4L with humidifier and oxygen increase to 90%. Breath sounds coarse/crackles to the left lung throughout, and coarse in lower base of right lung. Called to page Dr Lorinda Rinne and was told to perfect serve [employer]. Perfect served [employer] regarding oxygen. Patient reports no shortness of breath and feels fine except for feeling tired.        07/01/21 0715   Vital Signs   Resp Rate 24   O2 Sat (%) (!) 61 %   Oxygen Therapy   O2 Device None (Room air)

## 2021-07-01 NOTE — OP NOTES
[facility] 80  OPERATIVE REPORT    Name:  Madai Hare  MR#:  [id number]  :  1950  ACCOUNT #:  [de-identified]  DATE OF SERVICE:  2021    CLINICAL SERVICE:  General Surgery. PREOPERATIVE DIAGNOSIS:  Perforated appendicitis. POSTOPERATIVE DIAGNOSIS:  Perforated appendicitis. PROCEDURE PERFORMED:  Laparoscopic appendectomy. SURGEON:  Evon Corona MD    ASSISTANT:  See brief op note    ANESTHESIA:  General endotracheal anesthesia. COMPLICATIONS:  None. SPECIMENS REMOVED:  Appendix. IMPLANTS:  none    ESTIMATED BLOOD LOSS:  minimal.    DRAINS/TUBES:  CWV drains. INDICATIONS:  The patient is a 41-year-old woman who presents to the emergency department with a history of abdominal pain. She was noted on imaging to have a perforated appendicitis. She was brought to the operating room at this time for appendectomy. PROCEDURE:  After obtaining informed consent, the patient brought to the operating room where she was placed supine on the operating room table. After induction of general endotracheal anesthesia, the patient was prepped and draped in standard fashion. A surgical time-out was conducted and 3.375 g of IV Zosyn was administered as a preoperative antibiotic. With this complete, 0.25% Marcaine was infiltrated below the umbilicus. An 11-blade scalpel was used to make an incision. A Veress needle was placed to allow for insufflation. Once there was adequate insufflation, a 12-mm port was placed below the umbilicus. With this port in place, the abdomen was fully insufflated and the abdomen was inspected. There was peritonitis and significant inflammatory changes noted in the right lower quadrant. Two 5-mm ports were then placed, one in the lower midabdomen, one in the left lower quadrant. With these ports in place, the patient was positioned head down and left side down.   Dissection was carried out in the right lower quadrant to identify the appendix. The appendix was encapsulated with loops of small bowel. This small bowel were peeled back and the appendix was encased within filmy fibrinous material.  Once it was freed from the surrounding tissue using a combination of blunt dissection and the Harmonic scalpel, the appendix was dissected down to its base. The appendix was perforated at its insertion point at the cecum. Three firings of the ROSS stapler were used to amputate the appendix as well as a small cuff of the cecum to ensure that there was healthy tissue at the staple line. With the appendix amputated, the right lower quadrant was irrigated with normal saline. The appendix was placed in an EndoCatch bag and removed through the umbilical port site. The fascia of the umbilical port site was closed using 0 Vicryl figure-of-eight. Prior to closure of the umbilical port site, a drain was placed in the right lower quadrant, brought out through the lower midline 5 mm port site. The skin of the two port sites was closed using 4-0 Vicryl in subcuticular fashion and the drain was secured in place using 2-0 nylon. The patient tolerated the procedure well. There were no complications. All instrument, needle and sponge counts were correct at the end of the case.       Conchita Benites MD [id number]  D:  06/30/2021 21:48  T:  07/01/2021 8:36  JOB #:  [id number]

## 2021-07-01 NOTE — BRIEF OP NOTE
Brief Postoperative Note    Patient: Sirisha Almanza  YOB: 1950  MRN: [id number]    Date of Procedure: 6/30/2021     Pre-Op Diagnosis: Perforated Appendicitis    Post-Op Diagnosis: Same as preoperative diagnosis. Procedure(s):  APPENDECTOMY LAPAROSCOPIC    Surgeon(s):  Sugar Mims MD    Surgical Assistant: Surg Asst-1: Lynda Lee Anesthesia: General     Estimated Blood Loss (mL): Minimal    Complications: None    Specimens:   ID Type Source Tests Collected by Time Destination   1 : APPENDIX Preservative Appendix  Sugar Mims MD 6/30/2021 2033 Pathology        Implants: * No implants in log *    Drains:   Jose-Hunt Drain 06/30/21 Anterior Abdomen (Active)   Site Assessment Clean, dry, & intact 06/30/21 2047   Dressing Status Clean, dry, & intact 06/30/21 2047   Status Patent; Charged;Draining 06/30/21 2047       Findings: perforated appendicitis with localized peritonitis    Electronically Signed by Jeet Long MD on 6/30/2021 at 8:55 PM

## 2021-07-01 NOTE — PROGRESS NOTES
TRANSFER - IN REPORT:    Verbal report received from [facility] ) on Jonathan Mascorro  being received from [facility] for routine post - op      Report consisted of patients Situation, Background, Assessment and   Recommendations(SBAR). Information from the following report(s) SBAR, Kardex, OR Summary, Intake/Output, MAR, Recent Results and Pre Procedure Checklist was reviewed with the receiving nurse. Opportunity for questions and clarification was provided. Assessment completed upon patients arrival to unit and care assumed.

## 2021-07-01 NOTE — PROGRESS NOTES
General Surgery Daily Progress Note    Patient: Kassi Jean-Baptiste MRN: [id number]  SSN: [id number]    YOB: 1950  Age: 72 y.o. Sex: female      Admit Date: 6/30/2021    POD 1 Day Post-Op    Procedure: Procedure(s):  APPENDECTOMY LAPAROSCOPIC    Subjective:   Patient with some pain but remains on 4L NC to maintain stats in low 90s. Was de stating into the 60-70s after surgery. Current Facility-Administered Medications   Medication Dose Route Frequency    atenoloL (TENORMIN) tablet 25 mg  25 mg Oral DAILY    atorvastatin (LIPITOR) tablet 20 mg  20 mg Oral QHS    citalopram (CELEXA) tablet 20 mg  20 mg Oral DAILY    mesalamine ER (APRISO) 24 hour capsule 1.5 g (Patient Supplied)  1.5 g Oral DAILY    piperacillin-tazobactam (ZOSYN) 3.375 g in 0.9% sodium chloride (MBP/ADV) 100 mL MBP  3.375 g IntraVENous Q8H    sodium chloride (NS) flush 5-40 mL  5-40 mL IntraVENous Q8H    sodium chloride (NS) flush 5-40 mL  5-40 mL IntraVENous PRN    morphine injection 2 mg  2 mg IntraVENous Q4H PRN    oxyCODONE IR (ROXICODONE) tablet 5 mg  5 mg Oral Q4H PRN    acetaminophen (TYLENOL) tablet 650 mg  650 mg Oral Q6H PRN    ondansetron (ZOFRAN) injection 4 mg  4 mg IntraVENous Q4H PRN    enoxaparin (LOVENOX) injection 40 mg  40 mg SubCUTAneous Q24H        Objective:   07/01 0701 - 07/01 1900  In: 480 [P.O.:480]  Out: -   06/29 1901 - 07/01 0700  In: 1980 [P.O.:480; I.V.:1500]  Out: 115 [Drains:115]  Patient Vitals for the past 8 hrs:   BP Temp Pulse Resp SpO2   07/01/21 1141 111/69 100 °F (37.8 °C) (!) 110 19 90 %   07/01/21 0947 (!) 101/55  84     07/01/21 0729 99/60 98.1 °F (36.7 °C) 86 18 92 %   07/01/21 0720    24 90 %   07/01/21 0715    24 (!) 61 %       Physical Exam:  General: Alert, cooperative, NAD  Lungs: Unlabored but 4L NC. Abdomen: Soft, appropriate tenderness, mildly distended. Incisions C/D/I. Drain serosanguinous.    Extremities: Warm, moves all, no edema  Skin:  Warm and dry, no rash    Labs:   Recent Labs     06/30/21  0945   WBC 16.8*   HGB 12.3   HCT 36.7        Recent Labs     06/30/21  0945   *   K 3.6      CO2 26   *   BUN 17   CREA 0.92   CA 8.9   ALB 3.5   TBILI 1.1*   ALT 23       Assessment / Plan:     POD 1 Day Post-Op    Procedure: Procedure(s):  APPENDECTOMY LAPAROSCOPIC     Patient still requiring 4L NC  Will consult pulmonary  Ok for regular diet  D/C IVF  Continue IV ABX  Continue NOAH drain for now.

## 2021-07-02 ENCOUNTER — APPOINTMENT (OUTPATIENT)
Dept: GENERAL RADIOLOGY | Age: 71
DRG: 853 | End: 2021-07-02
Attending: PHYSICIAN ASSISTANT
Payer: MEDICARE

## 2021-07-02 PROCEDURE — 77030008771 HC TU NG SALEM SUMP -A

## 2021-07-02 PROCEDURE — 74011250637 HC RX REV CODE- 250/637: Performed by: SURGERY

## 2021-07-02 PROCEDURE — 74011250636 HC RX REV CODE- 250/636: Performed by: SURGERY

## 2021-07-02 PROCEDURE — 65270000029 HC RM PRIVATE

## 2021-07-02 PROCEDURE — 77010033678 HC OXYGEN DAILY

## 2021-07-02 PROCEDURE — 2709999900 HC NON-CHARGEABLE SUPPLY

## 2021-07-02 PROCEDURE — 74011000258 HC RX REV CODE- 258: Performed by: SURGERY

## 2021-07-02 PROCEDURE — 71045 X-RAY EXAM CHEST 1 VIEW: CPT

## 2021-07-02 RX ORDER — CALCIUM CARBONATE 200(500)MG
400 TABLET,CHEWABLE ORAL
Status: DISCONTINUED | OUTPATIENT
Start: 2021-07-02 | End: 2021-07-02

## 2021-07-02 RX ORDER — CALCIUM CARBONATE 200(500)MG
400 TABLET,CHEWABLE ORAL 4 TIMES DAILY
Status: DISCONTINUED | OUTPATIENT
Start: 2021-07-02 | End: 2021-07-02

## 2021-07-02 RX ORDER — PANTOPRAZOLE SODIUM 40 MG/1
40 TABLET, DELAYED RELEASE ORAL DAILY
Status: DISCONTINUED | OUTPATIENT
Start: 2021-07-02 | End: 2021-07-06 | Stop reason: HOSPADM

## 2021-07-02 RX ORDER — CALCIUM CARBONATE 200(500)MG
400 TABLET,CHEWABLE ORAL
Status: DISCONTINUED | OUTPATIENT
Start: 2021-07-02 | End: 2021-07-06 | Stop reason: HOSPADM

## 2021-07-02 RX ORDER — SODIUM CHLORIDE, SODIUM LACTATE, POTASSIUM CHLORIDE, CALCIUM CHLORIDE 600; 310; 30; 20 MG/100ML; MG/100ML; MG/100ML; MG/100ML
75 INJECTION, SOLUTION INTRAVENOUS CONTINUOUS
Status: DISCONTINUED | OUTPATIENT
Start: 2021-07-02 | End: 2021-07-04

## 2021-07-02 RX ADMIN — Medication 10 ML: at 22:28

## 2021-07-02 RX ADMIN — ONDANSETRON 4 MG: 2 INJECTION INTRAMUSCULAR; INTRAVENOUS at 03:03

## 2021-07-02 RX ADMIN — Medication 10 ML: at 06:31

## 2021-07-02 RX ADMIN — SODIUM CHLORIDE, POTASSIUM CHLORIDE, SODIUM LACTATE AND CALCIUM CHLORIDE 75 ML/HR: 600; 310; 30; 20 INJECTION, SOLUTION INTRAVENOUS at 15:08

## 2021-07-02 RX ADMIN — ONDANSETRON 4 MG: 2 INJECTION INTRAMUSCULAR; INTRAVENOUS at 06:31

## 2021-07-02 RX ADMIN — MESALAMINE 1.5 G: 0.38 CAPSULE, EXTENDED RELEASE ORAL at 09:03

## 2021-07-02 RX ADMIN — ENOXAPARIN SODIUM 40 MG: 40 INJECTION SUBCUTANEOUS at 22:28

## 2021-07-02 RX ADMIN — CITALOPRAM HYDROBROMIDE 20 MG: 20 TABLET ORAL at 08:52

## 2021-07-02 RX ADMIN — PIPERACILLIN AND TAZOBACTAM 3.38 G: 3; .375 INJECTION, POWDER, LYOPHILIZED, FOR SOLUTION INTRAVENOUS at 03:04

## 2021-07-02 RX ADMIN — PANTOPRAZOLE SODIUM 40 MG: 40 TABLET, DELAYED RELEASE ORAL at 08:52

## 2021-07-02 RX ADMIN — ANTACID TABLETS 400 MG: 500 TABLET, CHEWABLE ORAL at 06:31

## 2021-07-02 RX ADMIN — SODIUM CHLORIDE, POTASSIUM CHLORIDE, SODIUM LACTATE AND CALCIUM CHLORIDE 75 ML/HR: 600; 310; 30; 20 INJECTION, SOLUTION INTRAVENOUS at 19:59

## 2021-07-02 RX ADMIN — ONDANSETRON 4 MG: 2 INJECTION INTRAMUSCULAR; INTRAVENOUS at 18:46

## 2021-07-02 RX ADMIN — ATORVASTATIN CALCIUM 20 MG: 20 TABLET, FILM COATED ORAL at 22:28

## 2021-07-02 RX ADMIN — MORPHINE SULFATE 2 MG: 2 INJECTION, SOLUTION INTRAMUSCULAR; INTRAVENOUS at 18:46

## 2021-07-02 RX ADMIN — ATENOLOL 25 MG: 25 TABLET ORAL at 08:52

## 2021-07-02 RX ADMIN — ANTACID TABLETS 400 MG: 500 TABLET, CHEWABLE ORAL at 01:35

## 2021-07-02 RX ADMIN — MORPHINE SULFATE 2 MG: 2 INJECTION, SOLUTION INTRAMUSCULAR; INTRAVENOUS at 08:52

## 2021-07-02 RX ADMIN — OXYCODONE 5 MG: 5 TABLET ORAL at 06:31

## 2021-07-02 RX ADMIN — PIPERACILLIN AND TAZOBACTAM 3.38 G: 3; .375 INJECTION, POWDER, LYOPHILIZED, FOR SOLUTION INTRAVENOUS at 18:46

## 2021-07-02 RX ADMIN — OXYCODONE 5 MG: 5 TABLET ORAL at 03:03

## 2021-07-02 RX ADMIN — Medication 10 ML: at 15:09

## 2021-07-02 RX ADMIN — ONDANSETRON 4 MG: 2 INJECTION INTRAMUSCULAR; INTRAVENOUS at 12:09

## 2021-07-02 RX ADMIN — PIPERACILLIN AND TAZOBACTAM 3.38 G: 3; .375 INJECTION, POWDER, LYOPHILIZED, FOR SOLUTION INTRAVENOUS at 11:25

## 2021-07-02 NOTE — PROGRESS NOTES
Name: Arneta Snellen: P[place]   : 1950 Admit Date: 2021   Phone: [phone]  Room: [facility]/   PCP: Chyna Hayward DO  MRN: [id number]   Date: 2021  Code: Full Code          Chart and notes reviewed. Data reviewed. I review the patient's current medications in the medical record at each encounter. I have evaluated and examined the patient. History of Present Illness:  Ms. Any Castro is a 71 yo woman with a history of EDI (on CPAP), Ulcerative colitis, rheumatoid arthritis, atrial tachy cardia and GERD who is admitted for a perforated appendix s/p appendectomy. Noted to be hypoxic post-op. In the PACU she was noted to have sats in the 60's that improved with a NRB. She wore CPAP overnight and today has required 4L NC. CXR last night showed low lung volumes, bibasilar atelectasis and likely edema. She was given a dose of bumex today with reported response. She denies shortness of breath, cough, wheezing. She has expected post-op pain. She ambulated to the nurses station without her O2, but is not sure what her sats where at that time. She was diagnosed with EDI about 20 years ago, but was able to come off of CPAP after losing weight. She was diagnosed with EDI again about 2 months ago and has been wearing CPAP. Labs reviewed: WBC 16.8, cr 0.92, K 3.6    Images reviewed:  CXR 2021: low lung volume, bibasilar atelectasis and increased interstitial markings likely due to edema      PMH: as per HPI    Interval history  Afebrile  Sats 95% on 4L  BP stable  Blood cx no growth x 2 days  Did not use CPAP overnight but slept with O2    ROS: Feeling about the same. Not able to pull much more than 750 on her IS. Remains on 4L. Not passing flatus or having BM. Abd is distended. Feels nauseated and had episode of vomiting earlier this morning.     Past Surgical History:   Procedure Laterality Date    COLONOSCOPY N/A 2018    COLONOSCOPY performed by Felicia Silva Sheri Truong MD at [facility] 58 COLONOSCOPY N/A 2020    COLONOSCOPY performed by Beryle Snell, MD at [address]  BREAST SURGERY PROCEDURE UNLISTED      lumpectomy        Family History   Problem Relation Age of Onset    Other Father         lung and colon cancer    Cancer Father     Cancer Mother     Diabetes Paternal Uncle        Social History     Tobacco Use    Smoking status: Former Smoker     Quit date:      Years since quittin.5    Smokeless tobacco: Never Used   Substance Use Topics    Alcohol use: No       Allergies   Allergen Reactions    Adhesive Tape-Silicones Contact Dermatitis    Wellbutrin [Bupropion] Rash       Current Facility-Administered Medications   Medication Dose Route Frequency    pantoprazole (PROTONIX) tablet 40 mg  40 mg Oral DAILY    calcium carbonate (TUMS) chewable tablet 400 mg [elemental]  400 mg Oral QID PRN    sodium chloride (NS) flush 5-40 mL  5-40 mL IntraVENous PRN    0.9% sodium chloride infusion 25 mL  25 mL IntraVENous PRN    naloxone (NARCAN) injection 0.4 mg  0.4 mg IntraVENous Multiple    atenoloL (TENORMIN) tablet 25 mg  25 mg Oral DAILY    atorvastatin (LIPITOR) tablet 20 mg  20 mg Oral QHS    citalopram (CELEXA) tablet 20 mg  20 mg Oral DAILY    mesalamine ER (APRISO) 24 hour capsule 1.5 g (Patient Supplied)  1.5 g Oral DAILY    piperacillin-tazobactam (ZOSYN) 3.375 g in 0.9% sodium chloride (MBP/ADV) 100 mL MBP  3.375 g IntraVENous Q8H    sodium chloride (NS) flush 5-40 mL  5-40 mL IntraVENous Q8H    morphine injection 2 mg  2 mg IntraVENous Q4H PRN    oxyCODONE IR (ROXICODONE) tablet 5 mg  5 mg Oral Q4H PRN    acetaminophen (TYLENOL) tablet 650 mg  650 mg Oral Q6H PRN    ondansetron (ZOFRAN) injection 4 mg  4 mg IntraVENous Q4H PRN    enoxaparin (LOVENOX) injection 40 mg  40 mg SubCUTAneous Q24H         REVIEW OF SYSTEMS   12 point ROS negative except as stated in the HPI.      Physical Exam:   Visit Vitals  /79 (BP 1 Location: Right arm, BP Patient Position: At rest)   Pulse 77   Temp 98.7 °F (37.1 °C)   Resp 17   Ht 5' 6\" (1.676 m)   Wt 72.6 kg (160 lb)   SpO2 95%   BMI 25.82 kg/m²       General:  Alert, cooperative, no distress, appears stated age. Head:  Normocephalic, without obvious abnormality, atraumatic. Eyes:  Conjunctivae/corneas clear. Nose: Nares normal. Septum midline. Mucosa normal.    Throat: Lips, mucosa, and tongue normal.    Neck: Supple, symmetrical, trachea midline, no adenopathy. Lungs:   Diminished anteriorly . Respirations non-labored. Chest wall:  No tenderness or deformity. Heart:  Regular rate and rhythm, S1, S2 normal, no murmur, click, rub or gallop. Abdomen:   Distended, tender to palpation, decreased/absent bowel sounds. Drain in place. Extremities: Extremities normal, atraumatic, no cyanosis or edema. Pulses: 2+ and symmetric all extremities. Skin: Skin color, texture, turgor normal. No rashes or lesions   Neurologic: Grossly nonfocal       Lab Results   Component Value Date/Time    Sodium 134 (L) 06/30/2021 09:45 AM    Potassium 3.6 06/30/2021 09:45 AM    Chloride 100 06/30/2021 09:45 AM    CO2 26 06/30/2021 09:45 AM    BUN 17 06/30/2021 09:45 AM    Creatinine 0.92 06/30/2021 09:45 AM    Glucose 229 (H) 06/30/2021 09:45 AM    Calcium 8.9 06/30/2021 09:45 AM    Magnesium 2.0 07/18/2013 03:03 AM    Phosphorus 2.6 07/18/2013 03:03 AM    Lactic acid 1.5 06/30/2021 04:58 PM       Lab Results   Component Value Date/Time    WBC 16.8 (H) 06/30/2021 09:45 AM    HGB 12.3 06/30/2021 09:45 AM    PLATELET 447 07/16/1314 09:45 AM    MCV 94.1 06/30/2021 09:45 AM       Lab Results   Component Value Date/Time    Alk.  phosphatase 82 06/30/2021 09:45 AM    Protein, total 7.1 06/30/2021 09:45 AM    Albumin 3.5 06/30/2021 09:45 AM    Globulin 3.6 06/30/2021 09:45 AM       No results found for: IRON, FE, TIBC, IBCT, PSAT, FERR    Lab Results Component Value Date/Time    TSH 1.11 07/18/2013 03:03 AM        No results found for: PH, PHI, PCO2, PCO2I, PO2, PO2I, HCO3, HCO3I, FIO2, FIO2I    Lab Results   Component Value Date/Time    Troponin-I, Qt. <0.05 07/05/2020 08:47 AM        Lab Results   Component Value Date/Time    Culture result: NO GROWTH 2 DAYS 06/30/2021 11:19 AM       No results found for: TOXA1, RPR, HBCM, HBSAG, HAAB, HCAB1, HAAT, G6PD, CRYAC, HIVGT, HIVR, HIV1, HIV12, HIVPC, HIVRPI    No results found for: VANCT, CPK    Lab Results   Component Value Date/Time    Color DARK YELLOW 06/30/2021 10:00 AM    Appearance CLOUDY (A) 06/30/2021 10:00 AM    Specific gravity 1.030 07/05/2020 12:09 PM    pH (UA) 5.0 06/30/2021 10:00 AM    Protein TRACE (A) 06/30/2021 10:00 AM    Glucose Negative 06/30/2021 10:00 AM    Ketone TRACE (A) 06/30/2021 10:00 AM    Bilirubin Negative 07/05/2020 12:09 PM    Blood Negative 06/30/2021 10:00 AM    Urobilinogen 0.2 06/30/2021 10:00 AM    Nitrites Negative 06/30/2021 10:00 AM    Leukocyte Esterase Negative 06/30/2021 10:00 AM    WBC 0-4 06/30/2021 10:00 AM    RBC 0-5 06/30/2021 10:00 AM    Bacteria Negative 06/30/2021 10:00 AM       IMPRESSION  · Acute respiratory failure with hypoxia  · EDI, wears CPAP  · Perforated appendix s/p appendectomy    PLAN  · Goal sats 88%, wean O2 as tolerated  · Will need exercise oximetry closer to discharge  · Suspect this is a combination of hypoventilation immediately post-op due to EDI, atelectasis and possible edema as a response to intraabdominal process  · She is now agreeable to repeat CXR  · Post op care per surgery. On clears.   · Pain control  · OOB/IS, ambulation as possible  · Anderson Morelos

## 2021-07-02 NOTE — PROGRESS NOTES
Problem: Falls - Risk of  Goal: *Absence of Falls  Description: Document Sarahy Scott Fall Risk and appropriate interventions in the flowsheet. Outcome: Progressing Towards Goal  Note: Fall Risk Interventions:            Medication Interventions: Teach patient to arise slowly                   Problem: Pressure Injury - Risk of  Goal: *Prevention of pressure injury  Description: Document David Scale and appropriate interventions in the flowsheet.   Outcome: Progressing Towards Goal  Note: Pressure Injury Interventions:       Moisture Interventions: Limit adult briefs    Activity Interventions: Increase time out of bed    Mobility Interventions: HOB 30 degrees or less    Nutrition Interventions: Document food/fluid/supplement intake

## 2021-07-02 NOTE — PROGRESS NOTES
Bedside and Verbal shift change report given to Sebastian Bagley (oncoming nurse) by William Ross (offgoing nurse). Report included the following information SBAR, Kardex, Procedure Summary, Intake/Output and MAR. Still no bowel sounds, crackles to lungs, no emesis, and not passing gas; Dr. Bhumi Haq aware. 1415- still no bowel sounds, continue to vomit, not passing gas and vomits with sips of water, perfect served Dr. Bhumi Hqa.

## 2021-07-02 NOTE — PROGRESS NOTES
7/2/2021 1:20 PM EMR reviewed, CM will continue to follow for discharge needs. JANNETTE Pond    RUR:  10%  Risk Level: [x]? Low []? Moderate []? High  Value-based purchasing: []? Yes [x]? No  Bundle patient: []? Yes [x]? No              Specify:      Transition of care plan:  1. POD 2 appendectomy, on 4L of O2, wasn't requiring supplemental O2 prior to surgery. Pulm consulted  2. Home with family at discharge, TBD on needs   3. Outpatient follow-up.   4. Pt's family to transport

## 2021-07-02 NOTE — PROGRESS NOTES
Patient nausea and acid reflux throughout night, zofran and tums. Emesis this AM with distension, no flatus, absent bowel sounds. Dr. Candy Smith notified. Orders to stop fluids, take back down to clears and  observe.

## 2021-07-02 NOTE — PROGRESS NOTES
SURGERY PROGRESS NOTE      Admit Date: 2021    POD 2 Days Post-Op    Procedure: Procedure(s):  APPENDECTOMY LAPAROSCOPIC      Subjective:   Nausea with vomiting today      Objective:     Visit Vitals  /74 (BP 1 Location: Right upper arm, BP Patient Position: At rest)   Pulse 74   Temp 98.2 °F (36.8 °C)   Resp 18   Ht 5' 6\" (1.676 m)   Wt 72.6 kg (160 lb)   SpO2 97%   BMI 25.82 kg/m²        Temp (24hrs), Av.3 °F (36.8 °C), Min:97.9 °F (36.6 °C), Max:98.7 °F (37.1 °C)      Physical Exam:     Abdomen:  Soft. Appropriately tender, non-distended.   Incision C/D/I. NOAH serous          Assessment:     Active Problems:    Perforated appendicitis (2021)        Plan/Recommendations/Medical Decision Making:   NPO  NG decompression  OOB as able

## 2021-07-02 NOTE — PROGRESS NOTES
Physician Progress Note      Monae Nair  CSN #:                  [id number]  :                       1950  ADMIT DATE:       2021 9:28 AM  DISCH DATE:  RESPONDING  PROVIDER #:        Leah Minaya MD          QUERY TEXT:    Pt admitted with  perforated appendicitis and is s/p appendectomy. Pt noted to have on admission, leukocytosis, tachycardia, hypotension and elevated lactic acid . If possible, please document in the progress notes and discharge summary if you are evaluating and /or treating any of the following:[[Sepsis ruled out[de-identified] Sepsis ruled out. }}    The medical record reflects the following:  Risk Factors: 73 yo F admitted with  perforated appendicitis and is s/p appendectomy  Clinical Indicators: WBC 16.9 neuts 92  glucose 229   LA 2.53  BP 95/49 Map 61  Pulse 91 Temp 100.5  Treatment: IV 2L NS Bolus, IV Zosyn  Options provided:  -- Sepsis POA 2/2 perforated appendicitis  -- Other - I will add my own diagnosis  -- Disagree - Not applicable / Not valid  -- Disagree - Clinically unable to determine / Unknown  -- Refer to Clinical Documentation Reviewer    PROVIDER RESPONSE TEXT:    Sepsis POA 2/2 perforated appendicitis.     Query created by: Sheng Diamond on 2021 8:29 AM      Electronically signed by:  Leha Minaya MD 2021 5:16 PM

## 2021-07-03 LAB
ANION GAP SERPL CALC-SCNC: 5 MMOL/L (ref 5–15)
BASOPHILS # BLD: 0 K/UL (ref 0–0.1)
BASOPHILS NFR BLD: 0 % (ref 0–1)
BUN SERPL-MCNC: 13 MG/DL (ref 6–20)
BUN/CREAT SERPL: 27 (ref 12–20)
CALCIUM SERPL-MCNC: 8.4 MG/DL (ref 8.5–10.1)
CHLORIDE SERPL-SCNC: 97 MMOL/L (ref 97–108)
CO2 SERPL-SCNC: 34 MMOL/L (ref 21–32)
CREAT SERPL-MCNC: 0.49 MG/DL (ref 0.55–1.02)
DIFFERENTIAL METHOD BLD: ABNORMAL
EOSINOPHIL # BLD: 0.2 K/UL (ref 0–0.4)
EOSINOPHIL NFR BLD: 2 % (ref 0–7)
ERYTHROCYTE [DISTWIDTH] IN BLOOD BY AUTOMATED COUNT: 14.1 % (ref 11.5–14.5)
GLUCOSE SERPL-MCNC: 117 MG/DL (ref 65–100)
HCT VFR BLD AUTO: 34.9 % (ref 35–47)
HGB BLD-MCNC: 11.7 G/DL (ref 11.5–16)
IMM GRANULOCYTES # BLD AUTO: 0 K/UL (ref 0–0.04)
IMM GRANULOCYTES NFR BLD AUTO: 0 % (ref 0–0.5)
LYMPHOCYTES # BLD: 0.8 K/UL (ref 0.8–3.5)
LYMPHOCYTES NFR BLD: 7 % (ref 12–49)
MCH RBC QN AUTO: 32.3 PG (ref 26–34)
MCHC RBC AUTO-ENTMCNC: 33.5 G/DL (ref 30–36.5)
MCV RBC AUTO: 96.4 FL (ref 80–99)
MONOCYTES # BLD: 0.8 K/UL (ref 0–1)
MONOCYTES NFR BLD: 8 % (ref 5–13)
NEUTS SEG # BLD: 9 K/UL (ref 1.8–8)
NEUTS SEG NFR BLD: 83 % (ref 32–75)
NRBC # BLD: 0 K/UL (ref 0–0.01)
NRBC BLD-RTO: 0 PER 100 WBC
PLATELET # BLD AUTO: 242 K/UL (ref 150–400)
PMV BLD AUTO: 9.4 FL (ref 8.9–12.9)
POTASSIUM SERPL-SCNC: 3.5 MMOL/L (ref 3.5–5.1)
RBC # BLD AUTO: 3.62 M/UL (ref 3.8–5.2)
SODIUM SERPL-SCNC: 136 MMOL/L (ref 136–145)
WBC # BLD AUTO: 10.9 K/UL (ref 3.6–11)

## 2021-07-03 PROCEDURE — 94760 N-INVAS EAR/PLS OXIMETRY 1: CPT

## 2021-07-03 PROCEDURE — 65270000029 HC RM PRIVATE

## 2021-07-03 PROCEDURE — 74011250636 HC RX REV CODE- 250/636: Performed by: SURGERY

## 2021-07-03 PROCEDURE — 74011250637 HC RX REV CODE- 250/637: Performed by: SURGERY

## 2021-07-03 PROCEDURE — 36415 COLL VENOUS BLD VENIPUNCTURE: CPT

## 2021-07-03 PROCEDURE — 74011000258 HC RX REV CODE- 258: Performed by: SURGERY

## 2021-07-03 PROCEDURE — 80048 BASIC METABOLIC PNL TOTAL CA: CPT

## 2021-07-03 PROCEDURE — 85025 COMPLETE CBC W/AUTO DIFF WBC: CPT

## 2021-07-03 RX ADMIN — ONDANSETRON 4 MG: 2 INJECTION INTRAMUSCULAR; INTRAVENOUS at 11:05

## 2021-07-03 RX ADMIN — ONDANSETRON 4 MG: 2 INJECTION INTRAMUSCULAR; INTRAVENOUS at 16:56

## 2021-07-03 RX ADMIN — MORPHINE SULFATE 2 MG: 2 INJECTION, SOLUTION INTRAMUSCULAR; INTRAVENOUS at 11:05

## 2021-07-03 RX ADMIN — ATORVASTATIN CALCIUM 20 MG: 20 TABLET, FILM COATED ORAL at 22:13

## 2021-07-03 RX ADMIN — ACETAMINOPHEN 650 MG: 325 TABLET ORAL at 07:31

## 2021-07-03 RX ADMIN — ATENOLOL 25 MG: 25 TABLET ORAL at 07:32

## 2021-07-03 RX ADMIN — Medication 10 ML: at 22:13

## 2021-07-03 RX ADMIN — PIPERACILLIN AND TAZOBACTAM 3.38 G: 3; .375 INJECTION, POWDER, LYOPHILIZED, FOR SOLUTION INTRAVENOUS at 17:01

## 2021-07-03 RX ADMIN — PIPERACILLIN AND TAZOBACTAM 3.38 G: 3; .375 INJECTION, POWDER, LYOPHILIZED, FOR SOLUTION INTRAVENOUS at 03:29

## 2021-07-03 RX ADMIN — PANTOPRAZOLE SODIUM 40 MG: 40 TABLET, DELAYED RELEASE ORAL at 07:32

## 2021-07-03 RX ADMIN — PIPERACILLIN AND TAZOBACTAM 3.38 G: 3; .375 INJECTION, POWDER, LYOPHILIZED, FOR SOLUTION INTRAVENOUS at 09:40

## 2021-07-03 RX ADMIN — Medication 10 ML: at 05:38

## 2021-07-03 RX ADMIN — MESALAMINE 1.5 G: 0.38 CAPSULE, EXTENDED RELEASE ORAL at 07:32

## 2021-07-03 RX ADMIN — CITALOPRAM HYDROBROMIDE 20 MG: 20 TABLET ORAL at 07:31

## 2021-07-03 RX ADMIN — ENOXAPARIN SODIUM 40 MG: 40 INJECTION SUBCUTANEOUS at 22:13

## 2021-07-03 RX ADMIN — Medication 10 ML: at 16:56

## 2021-07-03 RX ADMIN — SODIUM CHLORIDE, POTASSIUM CHLORIDE, SODIUM LACTATE AND CALCIUM CHLORIDE 75 ML/HR: 600; 310; 30; 20 INJECTION, SOLUTION INTRAVENOUS at 07:38

## 2021-07-03 NOTE — PROGRESS NOTES
Report received pt laying in bed assisted into chair voiced no other needs at this time call bell within reach     2213  Med given without difficulty. Pt informed nurse she is now passing gas and had a small blood tinged bowel movement     2315  Pt resting quietly with eyes closed wife at bedside no distress noted     0140  Rounds made pt laying in bed spouse ar bedside     0318  No changes noted     0540  Rounds made no distress noted call bell within reach     0725  Bedside and Verbal shift change report given to [address] (student nurse) (oncoming nurse) by Vicki Parra (offgoing nurse). Report included the following information SBAR, Kardex, OR Summary, Procedure Summary, Intake/Output, MAR and Recent Results.

## 2021-07-03 NOTE — PROGRESS NOTES
Name: Tennis Frames: [address]   : 1950 Admit Date: 2021   Phone: [phone]  Room: [facility]/01   PCP: Hui DO Roxann  MRN: [id number]   Date: 7/3/2021  Code: Full Code          Chart and notes reviewed. Data reviewed. I review the patient's current medications in the medical record at each encounter. I have evaluated and examined the patient. History of Present Illness:  Ms. Amena Peter is a 71 yo woman with a history of EDI (on CPAP), Ulcerative colitis, rheumatoid arthritis, atrial tachy cardia and GERD who is admitted for a perforated appendix s/p appendectomy. Noted to be hypoxic post-op. In the PACU she was noted to have sats in the 60's that improved with a NRB. She wore CPAP overnight and today has required 4L NC. CXR last night showed low lung volumes, bibasilar atelectasis and likely edema. She was given a dose of bumex today with reported response. She denies shortness of breath, cough, wheezing. She has expected post-op pain. She ambulated to the nurses station without her O2, but is not sure what her sats where at that time. She was diagnosed with EDI about 20 years ago, but was able to come off of CPAP after losing weight. She was diagnosed with EDI again about 2 months ago and has been wearing CPAP. Overnight Events:  Afebrile  BP and HR stable  Oxygen saturations 97% on 4L NC  WBC 10.9    Images reviewed:  CXR 21: Low lung volumes with bibasilar atelectasis right greater than left; pneumoperitoneum as described above most likely postoperative in nature. CXR 2021: low lung volume, bibasilar atelectasis and increased interstitial markings likely due to edema    CTA Chest 20: clear lungs; no PE.    ROS: Feeling slightly better this morning. Denies SOB, cough, wheezing, chest pain. Sat up in the chair this morning already for about an hour. Using IS.      Past Surgical History:   Procedure Laterality Date    COLONOSCOPY N/A 2018 COLONOSCOPY performed by Reed Crane MD at [facility] COLONOSCOPY N/A 2020    COLONOSCOPY performed by Frandy Sosa MD at [address] [place] [address] BREAST SURGERY PROCEDURE UNLISTED      lumpectomy        Family History   Problem Relation Age of Onset    Other Father         lung and colon cancer    Cancer Father     Cancer Mother     Diabetes Paternal Uncle        Social History     Tobacco Use    Smoking status: Former Smoker     Quit date:      Years since quittin.5    Smokeless tobacco: Never Used   Substance Use Topics    Alcohol use: No       Allergies   Allergen Reactions    Adhesive Tape-Silicones Contact Dermatitis    Wellbutrin [Bupropion] Rash       Current Facility-Administered Medications   Medication Dose Route Frequency    pantoprazole (PROTONIX) tablet 40 mg  40 mg Oral DAILY    calcium carbonate (TUMS) chewable tablet 400 mg [elemental]  400 mg Oral QID PRN    lactated Ringers infusion  75 mL/hr IntraVENous CONTINUOUS    sodium chloride (NS) flush 5-40 mL  5-40 mL IntraVENous PRN    0.9% sodium chloride infusion 25 mL  25 mL IntraVENous PRN    naloxone (NARCAN) injection 0.4 mg  0.4 mg IntraVENous Multiple    atenoloL (TENORMIN) tablet 25 mg  25 mg Oral DAILY    atorvastatin (LIPITOR) tablet 20 mg  20 mg Oral QHS    citalopram (CELEXA) tablet 20 mg  20 mg Oral DAILY    mesalamine ER (APRISO) 24 hour capsule 1.5 g (Patient Supplied)  1.5 g Oral DAILY    piperacillin-tazobactam (ZOSYN) 3.375 g in 0.9% sodium chloride (MBP/ADV) 100 mL MBP  3.375 g IntraVENous Q8H    sodium chloride (NS) flush 5-40 mL  5-40 mL IntraVENous Q8H    morphine injection 2 mg  2 mg IntraVENous Q4H PRN    oxyCODONE IR (ROXICODONE) tablet 5 mg  5 mg Oral Q4H PRN    acetaminophen (TYLENOL) tablet 650 mg  650 mg Oral Q6H PRN    ondansetron (ZOFRAN) injection 4 mg  4 mg IntraVENous Q4H PRN    enoxaparin (LOVENOX) injection 40 mg  40 mg SubCUTAneous Q24H         REVIEW OF SYSTEMS   12 point ROS negative except as stated in the HPI. Physical Exam:   Visit Vitals  /73 (BP 1 Location: Right upper arm, BP Patient Position: At rest)   Pulse 87   Temp 97.4 °F (36.3 °C)   Resp 16   Ht 5' 6\" (1.676 m)   Wt 72.6 kg (160 lb)   SpO2 97%   BMI 25.82 kg/m²       General:  Alert, cooperative, no distress, appears stated age. Head:  Normocephalic, without obvious abnormality, atraumatic. Eyes:  Conjunctivae/corneas clear. Nose: Nares normal. Septum midline. Mucosa normal.    Throat: Lips, mucosa, and tongue normal.    Neck: Supple, symmetrical, trachea midline, no adenopathy. Lungs:   Diminished bilateral bases; otherwise clear. Chest wall:  No tenderness or deformity. Heart:  Regular rate and rhythm, S1, S2 normal, no murmur, click, rub or gallop. Abdomen:   Distended, tender to palpation, decreased/absent bowel sounds. Drain in place. Extremities: Extremities normal, atraumatic, no cyanosis or edema. Pulses: 2+ and symmetric all extremities. Skin: Skin color, texture, turgor normal. No rashes or lesions   Neurologic: Grossly nonfocal       Lab Results   Component Value Date/Time    Sodium 136 07/03/2021 07:50 AM    Potassium 3.5 07/03/2021 07:50 AM    Chloride 97 07/03/2021 07:50 AM    CO2 34 (H) 07/03/2021 07:50 AM    BUN 13 07/03/2021 07:50 AM    Creatinine 0.49 (L) 07/03/2021 07:50 AM    Glucose 117 (H) 07/03/2021 07:50 AM    Calcium 8.4 (L) 07/03/2021 07:50 AM    Magnesium 2.0 07/18/2013 03:03 AM    Phosphorus 2.6 07/18/2013 03:03 AM    Lactic acid 1.5 06/30/2021 04:58 PM       Lab Results   Component Value Date/Time    WBC 10.9 07/03/2021 07:50 AM    HGB 11.7 07/03/2021 07:50 AM    PLATELET 040 65/62/8391 07:50 AM    MCV 96.4 07/03/2021 07:50 AM       Lab Results   Component Value Date/Time    Alk.  phosphatase 82 06/30/2021 09:45 AM    Protein, total 7.1 06/30/2021 09:45 AM    Albumin 3.5 06/30/2021 09:45 AM    Globulin 3.6 06/30/2021 09:45 AM       No results found for: IRON, FE, TIBC, IBCT, PSAT, FERR    Lab Results   Component Value Date/Time    TSH 1.11 07/18/2013 03:03 AM        No results found for: PH, PHI, PCO2, PCO2I, PO2, PO2I, HCO3, HCO3I, FIO2, FIO2I    Lab Results   Component Value Date/Time    Troponin-I, Qt. <0.05 07/05/2020 08:47 AM        Lab Results   Component Value Date/Time    Culture result: NO GROWTH 3 DAYS 06/30/2021 11:19 AM       No results found for: TOXA1, RPR, HBCM, HBSAG, HAAB, HCAB1, HAAT, G6PD, CRYAC, HIVGT, HIVR, HIV1, HIV12, HIVPC, HIVRPI    No results found for: VANCT, CPK    Lab Results   Component Value Date/Time    Color DARK YELLOW 06/30/2021 10:00 AM    Appearance CLOUDY (A) 06/30/2021 10:00 AM    Specific gravity 1.030 07/05/2020 12:09 PM    pH (UA) 5.0 06/30/2021 10:00 AM    Protein TRACE (A) 06/30/2021 10:00 AM    Glucose Negative 06/30/2021 10:00 AM    Ketone TRACE (A) 06/30/2021 10:00 AM    Bilirubin Negative 07/05/2020 12:09 PM    Blood Negative 06/30/2021 10:00 AM    Urobilinogen 0.2 06/30/2021 10:00 AM    Nitrites Negative 06/30/2021 10:00 AM    Leukocyte Esterase Negative 06/30/2021 10:00 AM    WBC 0-4 06/30/2021 10:00 AM    RBC 0-5 06/30/2021 10:00 AM    Bacteria Negative 06/30/2021 10:00 AM       IMPRESSION  · Acute respiratory failure with hypoxia  · EDI, wears CPAP  · Perforated appendix s/p appendectomy    PLAN  · Goal sats 88%, wean O2 as tolerated; dropped down to 2L NC in room  · Will need exercise oximetry closer to discharge  · CXR tomorrow AM  · Suspect this is a combination of hypoventilation immediately post-op due to EDI, atelectasis and possible edema as a response to intraabdominal process  · Post op care per surgery. On clears.   · Pain control  · OOB/IS, ambulation as possible  · Enrique Robledo, NP

## 2021-07-03 NOTE — PROGRESS NOTES
SURGERY PROGRESS NOTE      Admit Date: 2021    POD 3 Days Post-Op    Procedure: Procedure(s):  APPENDECTOMY LAPAROSCOPIC      Subjective:   Still with some nausea but feeling slightly better      Objective:     Visit Vitals  /70 (BP 1 Location: Right upper arm, BP Patient Position: At rest)   Pulse 82   Temp 98.1 °F (36.7 °C)   Resp 16   Ht 5' 6\" (1.676 m)   Wt 72.6 kg (160 lb)   SpO2 94%   BMI 25.82 kg/m²        Temp (24hrs), Av.1 °F (36.7 °C), Min:97.4 °F (36.3 °C), Max:98.4 °F (36.9 °C)      Physical Exam:     Abdomen:  Soft. Appropriately tender, non-distended.   Incision C/D/I. NOAH serous        Lab Results   Component Value Date/Time    WBC 10.9 2021 07:50 AM    HGB 11.7 2021 07:50 AM    Hemoglobin (POC) 12.6 2013 02:36 PM    HCT 34.9 (L) 2021 07:50 AM    Hematocrit (POC) 37 2013 02:36 PM    PLATELET 301  07:50 AM    MCV 96.4 2021 07:50 AM     Lab Results   Component Value Date/Time    GFR est non-AA >60 2021 07:50 AM    GFRNA, POC >60 2013 02:36 PM    GFR est AA >60 2021 07:50 AM    GFRAA, POC >60 2013 02:36 PM    Creatinine 0.49 (L) 2021 07:50 AM    Creatinine (POC) 0.9 2013 02:36 PM    BUN 13 2021 07:50 AM    BUN (POC) 19 2013 02:36 PM    Sodium 136 2021 07:50 AM    Sodium (POC) 135 (L) 2013 02:36 PM    Potassium 3.5 2021 07:50 AM    Potassium (POC) 4.2 2013 02:36 PM    Chloride 97 2021 07:50 AM    Chloride (POC) 96 (L) 2013 02:36 PM    CO2 34 (H) 2021 07:50 AM    Magnesium 2.0 2013 03:03 AM    Phosphorus 2.6 2013 03:03 AM       Assessment:     Active Problems:    Perforated appendicitis (2021)        Plan/Recommendations/Medical Decision Making:   NG out - clears slowly as tolerates  Continue NOAH  OOB as able  Continue IV abx

## 2021-07-03 NOTE — PROGRESS NOTES
Report received pt laying in bed voiced no needs at this time wife not at bedside at this time. NG tube noted to left nare connected  to LWS    2228  Pt given med without difficulty     2325  No distress noted     0114  Round made pt resting quietly wife at bedside     0329  IV hung without difficulty    0538  Rounds made pt resting quietly no distress pt assisted to bathroom voided without difficulty assisted back to bed     0705  Bedside and Verbal shift change report given to Demetrio (oncoming nurse) by Kalani Luna (offgoing nurse). Report included the following information SBAR, Kardex, ED Summary, Procedure Summary, Intake/Output, MAR and Recent Results.

## 2021-07-03 NOTE — PROGRESS NOTES
Bedside and Verbal shift change report given to Sebastian Bagley (oncoming nurse) by Jose Eduardo (offgoing nurse). Report included the following information SBAR, Kardex, Procedure Summary, Intake/Output and MAR. Can hear a few bowel sounds at this time. Drainage around NOAH drain, dressing changed. Patient sneezed and NG tube came out. Dr. Mily Bone is aware okay to stay out until she comes to see patient. State that patient is able to sip on ginger ale in the meantime. Drain remains out. monitoring patient to see how well tolerating without NG tube. Denies meals but takes ice chips. Not passing gas.

## 2021-07-04 ENCOUNTER — APPOINTMENT (OUTPATIENT)
Dept: GENERAL RADIOLOGY | Age: 71
DRG: 853 | End: 2021-07-04
Attending: NURSE PRACTITIONER
Payer: MEDICARE

## 2021-07-04 PROCEDURE — 71045 X-RAY EXAM CHEST 1 VIEW: CPT

## 2021-07-04 PROCEDURE — 74011250637 HC RX REV CODE- 250/637: Performed by: SURGERY

## 2021-07-04 PROCEDURE — 74011250636 HC RX REV CODE- 250/636: Performed by: SURGERY

## 2021-07-04 PROCEDURE — 65270000029 HC RM PRIVATE

## 2021-07-04 PROCEDURE — 94760 N-INVAS EAR/PLS OXIMETRY 1: CPT

## 2021-07-04 PROCEDURE — 74011000258 HC RX REV CODE- 258: Performed by: SURGERY

## 2021-07-04 RX ORDER — TRAMADOL HYDROCHLORIDE 50 MG/1
50 TABLET ORAL
Status: DISCONTINUED | OUTPATIENT
Start: 2021-07-04 | End: 2021-07-06 | Stop reason: HOSPADM

## 2021-07-04 RX ORDER — SIMETHICONE 80 MG
80 TABLET,CHEWABLE ORAL
Status: DISCONTINUED | OUTPATIENT
Start: 2021-07-04 | End: 2021-07-06 | Stop reason: HOSPADM

## 2021-07-04 RX ORDER — DOCUSATE SODIUM 100 MG/1
100 CAPSULE, LIQUID FILLED ORAL 2 TIMES DAILY
Status: DISCONTINUED | OUTPATIENT
Start: 2021-07-04 | End: 2021-07-06 | Stop reason: HOSPADM

## 2021-07-04 RX ADMIN — ATORVASTATIN CALCIUM 20 MG: 20 TABLET, FILM COATED ORAL at 21:31

## 2021-07-04 RX ADMIN — CITALOPRAM HYDROBROMIDE 20 MG: 20 TABLET ORAL at 09:10

## 2021-07-04 RX ADMIN — PANTOPRAZOLE SODIUM 40 MG: 40 TABLET, DELAYED RELEASE ORAL at 09:10

## 2021-07-04 RX ADMIN — ACETAMINOPHEN 650 MG: 325 TABLET ORAL at 09:46

## 2021-07-04 RX ADMIN — SODIUM CHLORIDE, POTASSIUM CHLORIDE, SODIUM LACTATE AND CALCIUM CHLORIDE 75 ML/HR: 600; 310; 30; 20 INJECTION, SOLUTION INTRAVENOUS at 05:40

## 2021-07-04 RX ADMIN — PIPERACILLIN AND TAZOBACTAM 3.38 G: 3; .375 INJECTION, POWDER, LYOPHILIZED, FOR SOLUTION INTRAVENOUS at 18:49

## 2021-07-04 RX ADMIN — Medication 10 ML: at 15:49

## 2021-07-04 RX ADMIN — MESALAMINE 1.5 G: 0.38 CAPSULE, EXTENDED RELEASE ORAL at 09:46

## 2021-07-04 RX ADMIN — PIPERACILLIN AND TAZOBACTAM 3.38 G: 3; .375 INJECTION, POWDER, LYOPHILIZED, FOR SOLUTION INTRAVENOUS at 11:35

## 2021-07-04 RX ADMIN — Medication 10 ML: at 21:31

## 2021-07-04 RX ADMIN — Medication 10 ML: at 05:40

## 2021-07-04 RX ADMIN — PIPERACILLIN AND TAZOBACTAM 3.38 G: 3; .375 INJECTION, POWDER, LYOPHILIZED, FOR SOLUTION INTRAVENOUS at 02:10

## 2021-07-04 RX ADMIN — ATENOLOL 25 MG: 25 TABLET ORAL at 09:10

## 2021-07-04 RX ADMIN — TRAMADOL HYDROCHLORIDE 50 MG: 50 TABLET, FILM COATED ORAL at 22:21

## 2021-07-04 RX ADMIN — ENOXAPARIN SODIUM 40 MG: 40 INJECTION SUBCUTANEOUS at 21:31

## 2021-07-04 NOTE — PROGRESS NOTES
Name: Meeta Jaramillo: [address]   : 1950 Admit Date: 2021   Phone: [phone]  Room: [facility]/01   PCP: Richard Kimble DO  MRN: [id number]   Date: 2021  Code: Full Code          Chart and notes reviewed. Data reviewed. I review the patient's current medications in the medical record at each encounter. I have evaluated and examined the patient. History of Present Illness:  Ms. Kay Quach is a 73 yo woman with a history of EDI (on CPAP), Ulcerative colitis, rheumatoid arthritis, atrial tachy cardia and GERD who is admitted for a perforated appendix s/p appendectomy. Noted to be hypoxic post-op. In the PACU she was noted to have sats in the 60's that improved with a NRB. She wore CPAP overnight and today has required 4L NC. CXR last night showed low lung volumes, bibasilar atelectasis and likely edema. She was given a dose of bumex today with reported response. She denies shortness of breath, cough, wheezing. She has expected post-op pain. She ambulated to the nurses station without her O2, but is not sure what her sats where at that time. She was diagnosed with EDI about 20 years ago, but was able to come off of CPAP after losing weight. She was diagnosed with EDI again about 2 months ago and has been wearing CPAP. Overnight Events:  Afebrile  BP and HR stable  Oxygen saturations 97% on 2L NC  No new labs this AM    Images reviewed:  CXR 21: unchanged mild bibasilar atelectasis; dilated loops of bowel in the upper abdomen. CXR 21: Low lung volumes with bibasilar atelectasis right greater than left; pneumoperitoneum as described above most likely postoperative in nature. CXR 2021: low lung volume, bibasilar atelectasis and increased interstitial markings likely due to edema    CTA Chest 20: clear lungs; no PE.    ROS: Feeling better this morning. Denies SOB, cough, wheezing, chest pain. Sat up in the chair this morning already.  Able to go up to 1000 on IS compared to 750 yesterday. She was able to have a bowel movement. Pain in abdomen is controlled.      Past Surgical History:   Procedure Laterality Date    COLONOSCOPY N/A 2018    COLONOSCOPY performed by Luis Echavarria MD at [address] COLONOSCOPY N/A 2020    COLONOSCOPY performed by Devaughn Martin MD at [address] UNLISTED      lumpectomy        Family History   Problem Relation Age of Onset    Other Father         lung and colon cancer    Cancer Father     Cancer Mother     Diabetes Paternal Uncle        Social History     Tobacco Use    Smoking status: Former Smoker     Quit date:      Years since quittin.5    Smokeless tobacco: Never Used   Substance Use Topics    Alcohol use: No       Allergies   Allergen Reactions    Adhesive Tape-Silicones Contact Dermatitis    Wellbutrin [Bupropion] Rash       Current Facility-Administered Medications   Medication Dose Route Frequency    pantoprazole (PROTONIX) tablet 40 mg  40 mg Oral DAILY    calcium carbonate (TUMS) chewable tablet 400 mg [elemental]  400 mg Oral QID PRN    lactated Ringers infusion  75 mL/hr IntraVENous CONTINUOUS    sodium chloride (NS) flush 5-40 mL  5-40 mL IntraVENous PRN    0.9% sodium chloride infusion 25 mL  25 mL IntraVENous PRN    naloxone (NARCAN) injection 0.4 mg  0.4 mg IntraVENous Multiple    atenoloL (TENORMIN) tablet 25 mg  25 mg Oral DAILY    atorvastatin (LIPITOR) tablet 20 mg  20 mg Oral QHS    citalopram (CELEXA) tablet 20 mg  20 mg Oral DAILY    mesalamine ER (APRISO) 24 hour capsule 1.5 g (Patient Supplied)  1.5 g Oral DAILY    piperacillin-tazobactam (ZOSYN) 3.375 g in 0.9% sodium chloride (MBP/ADV) 100 mL MBP  3.375 g IntraVENous Q8H    sodium chloride (NS) flush 5-40 mL  5-40 mL IntraVENous Q8H    morphine injection 2 mg  2 mg IntraVENous Q4H PRN    oxyCODONE IR (ROXICODONE) tablet 5 mg  5 mg Oral Q4H PRN    acetaminophen (TYLENOL) tablet 650 mg  650 mg Oral Q6H PRN    ondansetron (ZOFRAN) injection 4 mg  4 mg IntraVENous Q4H PRN    enoxaparin (LOVENOX) injection 40 mg  40 mg SubCUTAneous Q24H         REVIEW OF SYSTEMS   12 point ROS negative except as stated in the HPI. Physical Exam:   Visit Vitals  /69 (BP 1 Location: Right arm, BP Patient Position: Sitting)   Pulse 67   Temp 97.9 °F (36.6 °C)   Resp 18   Ht 5' 6\" (1.676 m)   Wt 72.6 kg (160 lb)   SpO2 97%   BMI 25.82 kg/m²       General:  Alert, cooperative, no distress, appears stated age. Head:  Normocephalic, without obvious abnormality, atraumatic. Eyes:  Conjunctivae/corneas clear. Nose: Nares normal. Septum midline. Mucosa normal.    Throat: Lips, mucosa, and tongue normal.    Neck: Supple, symmetrical, trachea midline, no adenopathy. Lungs:   Diminished bilateral bases, but improved; otherwise clear. Chest wall:  No tenderness or deformity. Heart:  Regular rate and rhythm, S1, S2 normal, no murmur, click, rub or gallop. Abdomen:   Distended, tender to palpation, decreased bowel sounds. Drain in place. Extremities: Extremities normal, atraumatic, no cyanosis or edema. Pulses: 2+ and symmetric all extremities.    Skin: Skin color, texture, turgor normal. No rashes or lesions   Neurologic: Grossly nonfocal       Lab Results   Component Value Date/Time    Sodium 136 07/03/2021 07:50 AM    Potassium 3.5 07/03/2021 07:50 AM    Chloride 97 07/03/2021 07:50 AM    CO2 34 (H) 07/03/2021 07:50 AM    BUN 13 07/03/2021 07:50 AM    Creatinine 0.49 (L) 07/03/2021 07:50 AM    Glucose 117 (H) 07/03/2021 07:50 AM    Calcium 8.4 (L) 07/03/2021 07:50 AM    Magnesium 2.0 07/18/2013 03:03 AM    Phosphorus 2.6 07/18/2013 03:03 AM    Lactic acid 1.5 06/30/2021 04:58 PM       Lab Results   Component Value Date/Time    WBC 10.9 07/03/2021 07:50 AM    HGB 11.7 07/03/2021 07:50 AM    PLATELET 909 32/23/5896 07:50 AM    MCV 96.4 07/03/2021 07:50 AM       Lab Results   Component Value Date/Time    Alk. phosphatase 82 06/30/2021 09:45 AM    Protein, total 7.1 06/30/2021 09:45 AM    Albumin 3.5 06/30/2021 09:45 AM    Globulin 3.6 06/30/2021 09:45 AM       No results found for: IRON, FE, TIBC, IBCT, PSAT, FERR    Lab Results   Component Value Date/Time    TSH 1.11 07/18/2013 03:03 AM        No results found for: PH, PHI, PCO2, PCO2I, PO2, PO2I, HCO3, HCO3I, FIO2, FIO2I    Lab Results   Component Value Date/Time    Troponin-I, Qt. <0.05 07/05/2020 08:47 AM        Lab Results   Component Value Date/Time    Culture result: NO GROWTH 4 DAYS 06/30/2021 11:19 AM       No results found for: TOXA1, RPR, HBCM, HBSAG, HAAB, HCAB1, HAAT, G6PD, CRYAC, HIVGT, HIVR, HIV1, HIV12, HIVPC, HIVRPI    No results found for: VANCT, CPK    Lab Results   Component Value Date/Time    Color DARK YELLOW 06/30/2021 10:00 AM    Appearance CLOUDY (A) 06/30/2021 10:00 AM    Specific gravity 1.030 07/05/2020 12:09 PM    pH (UA) 5.0 06/30/2021 10:00 AM    Protein TRACE (A) 06/30/2021 10:00 AM    Glucose Negative 06/30/2021 10:00 AM    Ketone TRACE (A) 06/30/2021 10:00 AM    Bilirubin Negative 07/05/2020 12:09 PM    Blood Negative 06/30/2021 10:00 AM    Urobilinogen 0.2 06/30/2021 10:00 AM    Nitrites Negative 06/30/2021 10:00 AM    Leukocyte Esterase Negative 06/30/2021 10:00 AM    WBC 0-4 06/30/2021 10:00 AM    RBC 0-5 06/30/2021 10:00 AM    Bacteria Negative 06/30/2021 10:00 AM       IMPRESSION  · Acute respiratory failure with hypoxia  · EDI, wears CPAP  · Perforated appendix s/p appendectomy    PLAN  · Goal sats 88%, wean O2 as tolerated; placed on RA in the room this morning  · Will need exercise oximetry closer to discharge  · Suspect this is a combination of hypoventilation immediately post-op due to EDI, atelectasis and possible edema as a response to intraabdominal process  · Post op care per surgery.   On clears; NG tube in place  · Pain control  · OOB/IS, ambulation as possible  · Enrique Robledo, NP

## 2021-07-04 NOTE — PROGRESS NOTES
SURGERY PROGRESS NOTE      Admit Date: 2021    POD 4 Days Post-Op    Procedure: Procedure(s):  APPENDECTOMY LAPAROSCOPIC      Subjective:   Feels better  +BM      Objective:     Visit Vitals  /69 (BP 1 Location: Right arm, BP Patient Position: Sitting)   Pulse 67   Temp 97.9 °F (36.6 °C)   Resp 18   Ht 5' 6\" (1.676 m)   Wt 72.6 kg (160 lb)   SpO2 97%   BMI 25.82 kg/m²        Temp (24hrs), Av.4 °F (36.9 °C), Min:97.9 °F (36.6 °C), Max:98.7 °F (37.1 °C)      Physical Exam:     Abdomen:  Soft. Non-tender, non-distended.   Incision C/D/I. NOAH serous          Assessment:     Active Problems:    Perforated appendicitis (2021)        Plan/Recommendations/Medical Decision Making:   Advance to fulls  OOB ambulate  Wean O2 as able - may need home O2  Hopefully home in next 1-2 days

## 2021-07-04 NOTE — PROGRESS NOTES
Problem: Falls - Risk of  Goal: *Absence of Falls  Description: Document Olive Syed Fall Risk and appropriate interventions in the flowsheet. Outcome: Progressing Towards Goal  Note: Fall Risk Interventions:       Mentation Interventions: Evaluate medications/consider consulting pharmacy    Medication Interventions: Evaluate medications/consider consulting pharmacy, Patient to call before getting OOB, Teach patient to arise slowly                   Problem: Patient Education: Go to Patient Education Activity  Goal: Patient/Family Education  Outcome: Progressing Towards Goal     Problem: Pressure Injury - Risk of  Goal: *Prevention of pressure injury  Description: Document David Scale and appropriate interventions in the flowsheet.   Outcome: Progressing Towards Goal  Note: Pressure Injury Interventions:       Moisture Interventions: Absorbent underpads    Activity Interventions: Increase time out of bed    Mobility Interventions: HOB 30 degrees or less, Pressure redistribution bed/mattress (bed type)    Nutrition Interventions: Document food/fluid/supplement intake    Friction and Shear Interventions: HOB 30 degrees or less                Problem: Patient Education: Go to Patient Education Activity  Goal: Patient/Family Education  Outcome: Progressing Towards Goal

## 2021-07-05 PROCEDURE — 74011000258 HC RX REV CODE- 258: Performed by: SURGERY

## 2021-07-05 PROCEDURE — 74011250637 HC RX REV CODE- 250/637: Performed by: SURGERY

## 2021-07-05 PROCEDURE — 65270000029 HC RM PRIVATE

## 2021-07-05 PROCEDURE — 74011250636 HC RX REV CODE- 250/636: Performed by: SURGERY

## 2021-07-05 PROCEDURE — 94760 N-INVAS EAR/PLS OXIMETRY 1: CPT

## 2021-07-05 RX ADMIN — PIPERACILLIN AND TAZOBACTAM 3.38 G: 3; .375 INJECTION, POWDER, LYOPHILIZED, FOR SOLUTION INTRAVENOUS at 20:28

## 2021-07-05 RX ADMIN — MESALAMINE 1.5 G: 0.38 CAPSULE, EXTENDED RELEASE ORAL at 10:54

## 2021-07-05 RX ADMIN — ATENOLOL 25 MG: 25 TABLET ORAL at 09:10

## 2021-07-05 RX ADMIN — PIPERACILLIN AND TAZOBACTAM 3.38 G: 3; .375 INJECTION, POWDER, LYOPHILIZED, FOR SOLUTION INTRAVENOUS at 10:55

## 2021-07-05 RX ADMIN — ANTACID TABLETS 400 MG: 500 TABLET, CHEWABLE ORAL at 20:37

## 2021-07-05 RX ADMIN — PIPERACILLIN AND TAZOBACTAM 3.38 G: 3; .375 INJECTION, POWDER, LYOPHILIZED, FOR SOLUTION INTRAVENOUS at 02:09

## 2021-07-05 RX ADMIN — Medication 10 ML: at 21:45

## 2021-07-05 RX ADMIN — CITALOPRAM HYDROBROMIDE 20 MG: 20 TABLET ORAL at 09:11

## 2021-07-05 RX ADMIN — Medication 10 ML: at 18:01

## 2021-07-05 RX ADMIN — ENOXAPARIN SODIUM 40 MG: 40 INJECTION SUBCUTANEOUS at 20:27

## 2021-07-05 RX ADMIN — PANTOPRAZOLE SODIUM 40 MG: 40 TABLET, DELAYED RELEASE ORAL at 09:11

## 2021-07-05 RX ADMIN — Medication 10 ML: at 06:03

## 2021-07-05 RX ADMIN — TRAMADOL HYDROCHLORIDE 50 MG: 50 TABLET, FILM COATED ORAL at 20:37

## 2021-07-05 RX ADMIN — ATORVASTATIN CALCIUM 20 MG: 20 TABLET, FILM COATED ORAL at 21:45

## 2021-07-05 RX ADMIN — DOCUSATE SODIUM 100 MG: 100 CAPSULE, LIQUID FILLED ORAL at 20:27

## 2021-07-05 NOTE — PROGRESS NOTES
SURGERY PROGRESS NOTE      Admit Date: 2021    POD 5 Days Post-Op    Procedure: Procedure(s):  APPENDECTOMY LAPAROSCOPIC      Subjective:   Feeling better  Still with O2 requirement      Objective:     Visit Vitals  /67 (BP 1 Location: Right upper arm, BP Patient Position: At rest)   Pulse 68   Temp 98.8 °F (37.1 °C)   Resp 16   Ht 5' 6\" (1.676 m)   Wt 72.6 kg (160 lb)   SpO2 93%   BMI 25.82 kg/m²        Temp (24hrs), Av.5 °F (36.9 °C), Min:97.8 °F (36.6 °C), Max:98.9 °F (37.2 °C)      Physical Exam:     Abdomen:  Soft. Appropriately tender, mildly distended. Incision C/D/I.           Assessment:     Active Problems:    Perforated appendicitis (2021)        Plan/Recommendations/Medical Decision Making:   Walking O2 today - may require home O2  Advance to regular diet today  Home tomorrow if feeling well

## 2021-07-05 NOTE — PROGRESS NOTES
7/5/2021 2:24 PM Met with pt, confirmed plan for discharge on 7/6. Pt signed 2nd IMM letter. CM received order for home O2. Will follow for walking oximetry test, preliminary information sent to [employer] via [employer]. 7/5/2021 8:34 AM EMR reviewed, following for discharge needs. Noted pt has been weaned to 2L of O2. JANNETTE Martinez     Transition of care plan:  1. POD 5 appendectomy, on 2L of O2, wasn't requiring supplemental O2 prior to surgery. Pulm consulted  2. Home with family at discharge, TBD on needs   3. Outpatient follow-up.   4. Pt's family to transport

## 2021-07-06 VITALS
RESPIRATION RATE: 16 BRPM | SYSTOLIC BLOOD PRESSURE: 115 MMHG | WEIGHT: 160 LBS | HEART RATE: 64 BPM | OXYGEN SATURATION: 93 % | BODY MASS INDEX: 25.71 KG/M2 | HEIGHT: 66 IN | DIASTOLIC BLOOD PRESSURE: 70 MMHG | TEMPERATURE: 98.6 F

## 2021-07-06 LAB
BACTERIA SPEC CULT: NORMAL
SERVICE CMNT-IMP: NORMAL

## 2021-07-06 PROCEDURE — 74011250637 HC RX REV CODE- 250/637: Performed by: SURGERY

## 2021-07-06 PROCEDURE — 74011000258 HC RX REV CODE- 258: Performed by: SURGERY

## 2021-07-06 PROCEDURE — 94618 PULMONARY STRESS TESTING: CPT

## 2021-07-06 PROCEDURE — 77010033678 HC OXYGEN DAILY

## 2021-07-06 PROCEDURE — 94760 N-INVAS EAR/PLS OXIMETRY 1: CPT

## 2021-07-06 PROCEDURE — 74011250636 HC RX REV CODE- 250/636: Performed by: SURGERY

## 2021-07-06 RX ORDER — AMOXICILLIN AND CLAVULANATE POTASSIUM 875; 125 MG/1; MG/1
1 TABLET, FILM COATED ORAL EVERY 12 HOURS
Qty: 6 TABLET | Refills: 0 | Status: SHIPPED | OUTPATIENT
Start: 2021-07-06 | End: 2021-07-09

## 2021-07-06 RX ADMIN — MESALAMINE 1.5 G: 0.38 CAPSULE, EXTENDED RELEASE ORAL at 08:21

## 2021-07-06 RX ADMIN — CITALOPRAM HYDROBROMIDE 20 MG: 20 TABLET ORAL at 08:20

## 2021-07-06 RX ADMIN — PIPERACILLIN AND TAZOBACTAM 3.38 G: 3; .375 INJECTION, POWDER, LYOPHILIZED, FOR SOLUTION INTRAVENOUS at 05:05

## 2021-07-06 RX ADMIN — ATENOLOL 25 MG: 25 TABLET ORAL at 08:19

## 2021-07-06 RX ADMIN — Medication 10 ML: at 05:09

## 2021-07-06 RX ADMIN — PANTOPRAZOLE SODIUM 40 MG: 40 TABLET, DELAYED RELEASE ORAL at 08:20

## 2021-07-06 RX ADMIN — ANTACID TABLETS 400 MG: 500 TABLET, CHEWABLE ORAL at 08:33

## 2021-07-06 RX ADMIN — ACETAMINOPHEN 650 MG: 325 TABLET ORAL at 08:33

## 2021-07-06 NOTE — DISCHARGE SUMMARY
Discharge Summary    Patient: Kelley Worrell               Sex: female          DOA: [unfilled]       YOB: 1950      Age:  72 y.o.        LOS:  LOS: 6 days                Admit Date: 6/30/2021    Discharge Date: 7/6/2021    Admission Diagnoses: Perforated appendicitis [K35.32]    Discharge Diagnoses:    Problem List as of 7/6/2021 Date Reviewed: 7/18/2013        Codes Class Noted - Resolved    Perforated appendicitis ICD-10-CM: K35.32  ICD-9-CM: 540.0  6/30/2021 - Present        C. difficile diarrhea ICD-10-CM: A04.72  ICD-9-CM: 008.45  7/6/2020 - Present        Ulcerative colitis ([facility] ICD-10-CM: K51.90  ICD-9-CM: 556.9  7/5/2020 - Present        Anemia ICD-10-CM: D64.9  ICD-9-CM: 285.9  7/5/2020 - Present        GERD (gastroesophageal reflux disease) ICD-10-CM: K21.9  ICD-9-CM: 530.81  7/5/2020 - Present        Leukocytosis ICD-10-CM: D72.829  ICD-9-CM: 288.60  7/5/2020 - Present        Hyperglycemia ICD-10-CM: R73.9  ICD-9-CM: 790.29  7/5/2020 - Present        Syncope and collapse ICD-10-CM: R55  ICD-9-CM: 780.2  7/17/2013 - Present        GI bleed ICD-10-CM: K92.2  ICD-9-CM: 578.9  7/17/2013 - Present        Ulcerative proctitis ([facility] (Chronic) ICD-10-CM: K51.20  ICD-9-CM: 556.2  7/17/2013 - Present        Depression (Chronic) ICD-10-CM: F32.9  ICD-9-CM: 250  7/17/2013 - Present        Atrial tachycardia ([facility] ICD-10-CM: I47.1  ICD-9-CM: 427.89  Unknown - Present        Palpitations ICD-10-CM: R00.2  ICD-9-CM: 785.1  Unknown - Present        Arrhythmia Atrial Fibrillation ICD-10-CM: I48.91  ICD-9-CM: 427.31  11/30/2010 - Present              Discharge Condition: Stable    Hospital Course: 73 yo female admitted with perforated appendicitis. She was taken to the OR and underwent lap appendectomy with drain placement. She was given IV ABX and improved as well as her drain was removed. Her post op course was also complicated by hypoxia for which pulmonary was consulted as well.   She had a walking pulse ox done and with 2L O2 did ok. CM was consulted to arrange for home O2 and patient will be d/c on a short course of PO ABX with f/u in our office. Consults: Pulmonary/Critical Care    Significant Diagnostic Studies: labs: done and radiology: CT  Discharge Medications:     Current Discharge Medication List      START taking these medications    Details   amoxicillin-clavulanate (AUGMENTIN) 875-125 mg per tablet Take 1 Tablet by mouth every twelve (12) hours for 3 days. Qty: 6 Tablet, Refills: 0         CONTINUE these medications which have NOT CHANGED    Details   traMADoL (ULTRAM) 50 mg tablet Take 50 mg by mouth once as needed for Pain. folic acid (FOLVITE) 1 mg tablet Take 1 mg by mouth daily. methotrexate (RHEUMATREX) 2.5 mg tablet Take 20 mg by mouth every Sunday. infliximab (REMICADE IV) by IntraVENous route. New start on 6/23/21, currently on loading phase, next dose due 7/7/21      cholecalciferol (VITAMIN D3) (1000 Units /25 mcg) tablet Take 1,000 Units by mouth daily. ferrous sulfate ER (Slow Release Iron) 160 mg (50 mg iron) TbER tablet Take 1 Tablet by mouth nightly. acetaminophen (Tylenol Extra Strength) 500 mg tablet Take 1,000 mg by mouth two (2) times a day. AM and HS      turmeric root extract 500 mg cap Take 1 Cap by mouth daily. atorvastatin (LIPITOR) 20 mg tablet Take 20 mg by mouth nightly. mesalamine ER (APRISO) 0.375 gram capsule Take 1.5 g by mouth daily. cyclobenzaprine (FLEXERIL) 10 mg tablet Take 10 mg by mouth nightly as needed for Muscle Spasm(s). zolpidem (Ambien) 5 mg tablet Take 5 mg by mouth nightly as needed for Sleep. atenolol (TENORMIN) 25 mg tablet Take 25 mg by mouth daily. acyclovir (ZOVIRAX) 400 mg tablet Take 400 mg by mouth daily. citalopram (CELEXA) 20 mg tablet Take 20 mg by mouth daily.              Activity: No lifting, Driving, or Strenuous exercise for the next several days until abdomen in longer sore and No heavy lifting for 2 weeks    Diet: Regular Diet    Wound Care: Keep wound clean and dry. Apply dry dressing as needed for saturation to old NOAH drain site. Follow-up: Call to arrange f/u with Dr. Caitlin Spivey in 2 weeks.

## 2021-07-06 NOTE — PROGRESS NOTES
Bedside shift change report given to Jonathan Francois (oncoming nurse) by Chapin Lawton (offgoing nurse).  Report included the following information SBAR, Kardex, ED Summary, Procedure Summary, Intake/Output and MAR.

## 2021-07-06 NOTE — PROGRESS NOTES
Name: Katlyn Figueroa: Arun Dong   : 1950 Admit Date: 2021   Phone: [phone]  Room: [facility]   PCP: Hui DO Roxann  MRN: [id number]   Date: 2021  Code: Full Code          Chart and notes reviewed. Data reviewed. I review the patient's current medications in the medical record at each encounter. I have evaluated and examined the patient. History of Present Illness:  Ms. Amena Peter is a 71 yo woman with a history of EDI (on CPAP), Ulcerative colitis, rheumatoid arthritis, atrial tachy cardia and GERD who is admitted for a perforated appendix s/p appendectomy. Noted to be hypoxic post-op. In the PACU she was noted to have sats in the 60's that improved with a NRB. She wore CPAP overnight and today has required 4L NC. CXR last night showed low lung volumes, bibasilar atelectasis and likely edema. She was given a dose of bumex today with reported response. She denies shortness of breath, cough, wheezing. She has expected post-op pain. She ambulated to the nurses station without her O2, but is not sure what her sats where at that time. She was diagnosed with EDI about 20 years ago, but was able to come off of CPAP after losing weight. She was diagnosed with EDI again about 2 months ago and has been wearing CPAP.  - ON - she is on 2 liters with sats 93% - per  she has qualified for o2 but may have to pay out of pocket as she does not have an underlying pulm dx. Overnight Events:  Afebrile  BP and HR stable  Oxygen saturations 97% on 4L NC  WBC 10.9    Images reviewed:  CXR 21: Low lung volumes with bibasilar atelectasis right greater than left; pneumoperitoneum as described above most likely postoperative in nature. CXR 2021: low lung volume, bibasilar atelectasis and increased interstitial markings likely due to edema    CTA Chest 20: clear lungs; no PE.    ROS: Feeling slightly better this morning.  Denies SOB, cough, wheezing, chest pain. Sat up in the chair this morning already for about an hour. Using IS.      Past Surgical History:   Procedure Laterality Date    COLONOSCOPY N/A 2018    COLONOSCOPY performed by Tori Anglin MD at [address] [facility] COLONOSCOPY N/A 2020    COLONOSCOPY performed by Feli Roberts MD at [address] [facility]. UNLISTED      lumpectomy        Family History   Problem Relation Age of Onset    Other Father         lung and colon cancer    Cancer Father     Cancer Mother     Diabetes Paternal Uncle        Social History     Tobacco Use    Smoking status: Former Smoker     Quit date:      Years since quittin.5    Smokeless tobacco: Never Used   Substance Use Topics    Alcohol use: No       Allergies   Allergen Reactions    Adhesive Tape-Silicones Contact Dermatitis    Wellbutrin [Bupropion] Rash       Current Facility-Administered Medications   Medication Dose Route Frequency    piperacillin-tazobactam (ZOSYN) 3.375 g in 0.9% sodium chloride (MBP/ADV) 100 mL MBP  3.375 g IntraVENous Q8H    traMADoL (ULTRAM) tablet 50 mg  50 mg Oral Q6H PRN    simethicone (MYLICON) tablet 80 mg  80 mg Oral QID PRN    docusate sodium (COLACE) capsule 100 mg  100 mg Oral BID    pantoprazole (PROTONIX) tablet 40 mg  40 mg Oral DAILY    calcium carbonate (TUMS) chewable tablet 400 mg [elemental]  400 mg Oral QID PRN    sodium chloride (NS) flush 5-40 mL  5-40 mL IntraVENous PRN    0.9% sodium chloride infusion 25 mL  25 mL IntraVENous PRN    naloxone (NARCAN) injection 0.4 mg  0.4 mg IntraVENous Multiple    atenoloL (TENORMIN) tablet 25 mg  25 mg Oral DAILY    atorvastatin (LIPITOR) tablet 20 mg  20 mg Oral QHS    citalopram (CELEXA) tablet 20 mg  20 mg Oral DAILY    mesalamine ER (APRISO) 24 hour capsule 1.5 g (Patient Supplied)  1.5 g Oral DAILY    sodium chloride (NS) flush 5-40 mL  5-40 mL IntraVENous Q8H    morphine injection 2 mg  2 mg IntraVENous Q4H PRN    acetaminophen (TYLENOL) tablet 650 mg  650 mg Oral Q6H PRN    ondansetron (ZOFRAN) injection 4 mg  4 mg IntraVENous Q4H PRN    enoxaparin (LOVENOX) injection 40 mg  40 mg SubCUTAneous Q24H         REVIEW OF SYSTEMS   12 point ROS negative except as stated in the HPI. Physical Exam:   Visit Vitals  /70 (BP 1 Location: Right arm, BP Patient Position: Sitting)   Pulse 74   Temp 99 °F (37.2 °C)   Resp 18   Ht 5' 6\" (1.676 m)   Wt 72.6 kg (160 lb)   SpO2 92%   BMI 25.82 kg/m²       General:  Alert, cooperative, no distress, appears stated age. Sitting in chair . Head:  Normocephalic, without obvious abnormality, atraumatic. Eyes:  Conjunctivae/corneas clear. Nose: Nares normal. Septum midline. Mucosa normal.    Throat: Lips, mucosa, and tongue normal.    Neck: Supple, symmetrical, trachea midline, no adenopathy. Lungs:   Clear . Chest wall:  No tenderness or deformity. Heart:  Regular rate and rhythm, S1, S2 normal, no murmur, click, rub or gallop. Abdomen:   Distended, tender to palpation, decreased/absent bowel sounds. Drain in place. Extremities: Extremities normal, atraumatic, no cyanosis or edema.    Pulses:    Skin: Skin color, texture, turgor normal. No rashes or lesions   Neurologic: Grossly nonfocal       Lab Results   Component Value Date/Time    Sodium 136 07/03/2021 07:50 AM    Potassium 3.5 07/03/2021 07:50 AM    Chloride 97 07/03/2021 07:50 AM    CO2 34 (H) 07/03/2021 07:50 AM    BUN 13 07/03/2021 07:50 AM    Creatinine 0.49 (L) 07/03/2021 07:50 AM    Glucose 117 (H) 07/03/2021 07:50 AM    Calcium 8.4 (L) 07/03/2021 07:50 AM    Magnesium 2.0 07/18/2013 03:03 AM    Phosphorus 2.6 07/18/2013 03:03 AM    Lactic acid 1.5 06/30/2021 04:58 PM       Lab Results   Component Value Date/Time    WBC 10.9 07/03/2021 07:50 AM    HGB 11.7 07/03/2021 07:50 AM    PLATELET 541 06/68/8203 07:50 AM    MCV 96.4 07/03/2021 07:50 AM       Lab Results Component Value Date/Time    Alk. phosphatase 82 06/30/2021 09:45 AM    Protein, total 7.1 06/30/2021 09:45 AM    Albumin 3.5 06/30/2021 09:45 AM    Globulin 3.6 06/30/2021 09:45 AM       No results found for: IRON, FE, TIBC, IBCT, PSAT, FERR    Lab Results   Component Value Date/Time    TSH 1.11 07/18/2013 03:03 AM        No results found for: PH, PHI, PCO2, PCO2I, PO2, PO2I, HCO3, HCO3I, FIO2, FIO2I    Lab Results   Component Value Date/Time    Troponin-I, Qt. <0.05 07/05/2020 08:47 AM        Lab Results   Component Value Date/Time    Culture result: NO GROWTH 6 DAYS 06/30/2021 11:19 AM       No results found for: TOXA1, RPR, HBCM, HBSAG, HAAB, HCAB1, HAAT, G6PD, CRYAC, HIVGT, HIVR, HIV1, HIV12, HIVPC, HIVRPI    No results found for: VANCT, CPK   All cxr's this admit seen. - clear lungs and mild atx/ decreased lung expansion with dilated bowel loops noted     Lab Results   Component Value Date/Time    Color DARK YELLOW 06/30/2021 10:00 AM    Appearance CLOUDY (A) 06/30/2021 10:00 AM    Specific gravity 1.030 07/05/2020 12:09 PM    pH (UA) 5.0 06/30/2021 10:00 AM    Protein TRACE (A) 06/30/2021 10:00 AM    Glucose Negative 06/30/2021 10:00 AM    Ketone TRACE (A) 06/30/2021 10:00 AM    Bilirubin Negative 07/05/2020 12:09 PM    Blood Negative 06/30/2021 10:00 AM    Urobilinogen 0.2 06/30/2021 10:00 AM    Nitrites Negative 06/30/2021 10:00 AM    Leukocyte Esterase Negative 06/30/2021 10:00 AM    WBC 0-4 06/30/2021 10:00 AM    RBC 0-5 06/30/2021 10:00 AM    Bacteria Negative 06/30/2021 10:00 AM       IMPRESSION  · Acute respiratory failure with hypoxia- home o2 is being arrange. · EDI, wears CPAP  · Perforated appendix s/p appendectomy  · Bibasilar atx. Post op     PLAN  · HOme o2  ·  will arrange for par tele  Visit within a week  Of dc / She has a pulse ox and can check her ra sat rest and exercise before the visit.   · Suspect this is a combination of hypoventilation immediately post-op due to EDI, atelectasis and possible edema as a response to intraabdominal process- all better   · Post op care per surgery. · Pain control  · OOB/IS, ambulation as possible  · Lovenox while here   · Par will fu post dc . Will sign off at  This time. ·  Discussed with , family member and pt . · She said she might want to see Dr. Isis Campbell for official pulm eval in the near  Future . Has smoking hx.         Shane Edwards MD

## 2021-07-06 NOTE — PROGRESS NOTES
07/06/21 0903   Resting (Room Air)   SpO2 90   HR 77   During Walk (Room Air)   SpO2 85   HR 82   Rate of Dyspnea 1   Comments placed on 2L NC   During Walk (On O2)   SpO2 91   HR 84   O2 Device Nasal cannula   O2 Flow Rate (l/min) 2 l/min   After Walk   SpO2 94   HR 79   O2 Device Nasal cannula   O2 Flow Rate (l/min) 2 l/min   Comments pt back to room sitting in chair on 2L NC

## 2021-07-06 NOTE — DISCHARGE INSTRUCTIONS
Patient Education        Appendectomy: What to Expect at Home  Your Recovery     Your doctor removed your appendix either by making many small cuts, called incisions, in your belly (laparoscopic surgery) or through open surgery. In open surgery, the doctor makes one large incision. The incisions leave scars that usually fade over time. After your surgery, it is normal to feel weak and tired for several days after you return home. Your belly may be swollen and may be painful. If you had laparoscopic surgery, you may have pain in your shoulder for about 24 hours. You may also feel sick to your stomach and have diarrhea, constipation, gas, or a headache. This usually goes away in a few days. Your recovery time depends on the type of surgery you had. If you had laparoscopic surgery, you will probably be able to return to work or a normal routine 1 to 3 weeks after surgery. If you had an open surgery, it may take 2 to 4 weeks. If your appendix ruptured, you may have a drain in your incision. Your body will work fine without an appendix. You won't have to make any changes in your diet or lifestyle. This care sheet gives you a general idea about how long it will take for you to recover. But each person recovers at a different pace. Follow the steps below to get better as quickly as possible. How can you care for yourself at home? Activity    · Rest when you feel tired. Getting enough sleep will help you recover.     · Try to walk each day. Start by walking a little more than you did the day before. Bit by bit, increase the amount you walk. Walking boosts blood flow and helps prevent pneumonia and constipation.     · For about 2 weeks, avoid lifting anything that would make you strain.  This may include a child, heavy grocery bags and milk containers, a heavy briefcase or backpack, cat litter or dog food bags, or a vacuum .     · Avoid strenuous activities, such as bicycle riding, jogging, weight lifting, or aerobic exercise, until your doctor says it is okay.     · You may be able to take showers (unless you have a drain near your incision) 24 to 48 hours after surgery. Pat the incision dry. Do not take a bath for the first 2 weeks, or until your doctor tells you it is okay. If you have a drain near your incision, follow your doctor's instructions.     · You may drive when you are no longer taking pain medicine and can quickly move your foot from the gas pedal to the brake. You must also be able to sit comfortably for a long period of time, even if you do not plan on going far. You might get caught in traffic.     · You will probably be able to go back to work in 1 to 3 weeks. If you had an open surgery, it may take 3 to 4 weeks.     · Your doctor will tell you when you can have sex again. Diet    · You can eat your normal diet. If your stomach is upset, try bland, low-fat foods like plain rice, broiled chicken, toast, and yogurt.     · Drink plenty of fluids (unless your doctor tells you not to).     · You may notice that your bowel movements are not regular right after your surgery. This is common. Try to avoid constipation and straining with bowel movements. You may want to take a fiber supplement every day. If you have not had a bowel movement after a couple of days, ask your doctor about taking a mild laxative. Medicines    · Your doctor will tell you if and when you can restart your medicines. He or she will also give you instructions about taking any new medicines.     · If you take aspirin or some other blood thinner, ask your doctor if and when to start taking it again. Make sure that you understand exactly what your doctor wants you to do.     · If your appendix ruptured, you will need to take antibiotics. Take them as directed. Do not stop taking them just because you feel better. You need to take the full course of antibiotics.     · Be safe with medicines. Take pain medicines exactly as directed. ?  If the doctor gave you a prescription medicine for pain, take it as prescribed. ? If you are not taking a prescription pain medicine, take an over-the-counter medicine such as acetaminophen (Tylenol), ibuprofen (Advil, Motrin), or naproxen (Aleve). Read and follow all instructions on the label. ? Do not take two or more pain medicines at the same time unless the doctor told you to. Many pain medicines have acetaminophen, which is Tylenol. Too much Tylenol can be harmful.     · If you think your pain medicine is making you sick to your stomach:  ? Take your medicine after meals (unless your doctor has told you not to). ? Ask your doctor for a different pain medicine. Incision care    · If you had an open surgery, you may have staples in your incision. The doctor will take these out in 7 to 10 days.     · If you have strips of tape on the incision, leave the tape on for a week or until it falls off.     · You may wash the area with warm, soapy water 24 to 48 hours after your surgery, unless your doctor tells you not to. Pat the area dry.     · Keep the area clean and dry. You may cover it with a gauze bandage if it weeps or rubs against clothing. Change the bandage every day.     · If your appendix ruptured, you may have an incision with packing in it. Change the packing as often as your doctor tells you to. ? Packing changes may hurt at first. Taking pain medicine about half an hour before you change the dressing can help. ? If your dressing sticks to your wound, try soaking it with warm water for about 10 minutes before you remove it. You can do this in the shower or by placing a wet washcloth over the dressing. ? Remove the old packing and flush the incision with water. Gently pat the top area dry. ? The size of the incision determines how much gauze you need to put inside. Fold the gauze over once, but do not wad it up so that it hurts. Put it in the wound carefully.  You want to keep the sides of the wound from touching. A cotton swab may help you push the gauze in as needed. ? Put a gauze pad over the wound, and tape it down. ? You may notice greenish gray fluid seeping from your wound as you start to heal. This is normal. It is a sign that your wound is healing. Follow-up care is a key part of your treatment and safety. Be sure to make and go to all appointments, and call your doctor if you are having problems. It's also a good idea to know your test results and keep a list of the medicines you take. When should you call for help? Call 911 anytime you think you may need emergency care. For example, call if:    · You passed out (lost consciousness).     · You are short of breath. .   Call your doctor now or seek immediate medical care if:    · You are sick to your stomach and cannot drink fluids.     · You cannot pass stools or gas.     · You have pain that does not get better when you take your pain medicine.     · You have signs of infection, such as:  ? Increased pain, swelling, warmth, or redness. ? Red streaks leading from the wound. ? Pus draining from the wound. ? A fever.     · You have loose stitches, or your incision comes open.     · Bright red blood has soaked through the bandage over your incision.     · You have signs of a blood clot in your leg (called a deep vein thrombosis), such as:  ? Pain in your calf, back of knee, thigh, or groin. ? Redness and swelling in your leg or groin. Watch closely for any changes in your health, and be sure to contact your doctor if you have any problems. Where can you learn more? Go to http://www.gray.com/  Enter X801 in the search box to learn more about \"Appendectomy: What to Expect at Home. \"  Current as of: April 15, 2020               Content Version: 12.8  © 8342-5378 [employer], [employer]. Care instructions adapted under license by [employer] (which disclaims liability or warranty for this information).  If you have questions about a medical condition or this instruction, always ask your healthcare professional. [place] [address] any warranty or liability for your use of this information.

## 2021-07-06 NOTE — PROGRESS NOTES
Discussed discharge information with patient. The patient verbalized understanding. Patient provided with written prescriptions. Opportunity for questions and clarification offered. Removed patient's IV access with no complications. Vital signs stable. Patient sent with all belongings and portable oxygen.

## 2021-07-08 NOTE — PROGRESS NOTES
Physician Progress Note      Lita Alanis  CSN #:                  [id number]  :                       1950  ADMIT DATE:       2021 9:28 AM  DISCH DATE:        2021 11:56 AM  RESPONDING  PROVIDER #:        Belen Johnson MD          QUERY TEXT:    Pt admitted with Sepsis 2/2 perforated appendicitis and is s/p appendectomy. Noted documentation of \"acute respiratory failure with hypoxia, Suspect this is a combination of hypoventilation immediately post-op due to EDI, atelectasis and possible edema as a response to intraabdominal process\" per pulmonary on 21. Please document in progress notes and discharge summary if you are evaluating/treating any of the following: The medical record reflects the following:  Risk Factors: 73 yo F admitted with Sepsis 2/2 perforated appendicitis and is s/p appendectomy on 21  Clinical Indicators:  @  0715  RR 24 O2 sats 61% on RA and RR 24 with 90% O2 sats on 4L via NC  Noted documentation of  \"acute respiratory failure with hypoxia, Suspect this is a combination of hypoventilation immediately post-op due to EDI, atelectasis and possible edema as a response to intraabdominal process\" per pulmonary on 21. Treatment: O2 @ 4L via NC  Options provided:  -- Acute hypoxic Respiratory failure is due to chronic underlying condition of EDI and is not a complication of the procedure  -- Acute Hypoxic Respiratory failure is not a complication of the procedure but was due to, Please specify.   -- Acute Postoperative Pulmonary Insufficiency, Postoperative Respiratory failure is ruled out  -- Postoperative Respiratory failure is a complication of the procedure  -- Postoperative Respiratory failure ruled out after study  -- Other - I will add my own diagnosis  -- Disagree - Not applicable / Not valid  -- Disagree - Clinically unable to determine / Unknown  -- Refer to Clinical Documentation Reviewer    PROVIDER RESPONSE TEXT:    Provider is clinically unable to determine a response to this query.     Query created by: Christel Peralta on 7/7/2021 7:22 AM      Electronically signed by:  Tati Salazar MD 7/8/2021 9:08 AM

## 2022-01-06 NOTE — PROGRESS NOTES
Follow up visit with Miss Chris Marshall, on 4 Post Surg. She indicated she is still in discomfort, she was appreciative of  Pamella's visit yesterday. Provided supportive spiritual presence and assurance of prayers. Contact [facility] for any further referrals.   Visited by: Bharat Garcia., MS., [address] ([id number]) Hemigard Retention Suture: 2-0 Nylon

## 2022-01-31 NOTE — INTERVAL H&P NOTE
Pre-Endoscopy H&P Update  Chief complaint/HPI/ROS:  The indication for the procedure, the patient's history and the patient's current medications are reviewed prior to the procedure and that data is reported on the H&P to which this document is attached. Any significant complaints with regard to organ systems will be noted, and if not mentioned then a review of systems is not contributory. Past Medical History:   Diagnosis Date    Arrhythmia     a tach     Atrial tachycardia ([address]     Colitis, chronic, ulcerative ([address] 2013    GERD (gastroesophageal reflux disease)     Palpitations     Sleep apnea     has lost weight, better now. 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([address]       Past Surgical History:   Procedure Laterality Date    BREAST SURGERY PROCEDURE UNLISTED      lumpectomy 1998    HX TONSILLECTOMY      1960     Social   Social History   Substance Use Topics    Smoking status: Former Smoker     Quit date: 2003    Smokeless tobacco: Never Used    Alcohol use No      Family History   Problem Relation Age of Onset    Other Father      lung and colon cancer    Cancer Father     Cancer Mother       Allergies   Allergen Reactions    Adhesive Tape-Silicones Contact Dermatitis    Wellbutrin [Bupropion] Rash      Prior to Admission Medications   Prescriptions Last Dose Informant Patient Reported? Taking?   acyclovir (ZOVIRAX) 400 mg tablet 7/23/2018 at Unknown time  Yes Yes   Sig: Take 400 mg by mouth two (2) times a day. aspirin 81 mg chewable tablet 7/20/2018  Yes No   Sig: Take 81 mg by mouth every Monday, Wednesday, Friday. atenolol (TENORMIN) 25 mg tablet 7/23/2018 at Unknown time  Yes Yes   Sig: Take 25 mg by mouth daily. atorvastatin (LIPITOR) 20 mg tablet 7/23/2018 at Unknown time  Yes Yes   Sig: Take 20 mg by mouth daily. calcium carbonate (OS-VICENTA) 500 mg calcium (1,250 mg) tablet 7/22/2018  Yes No   Sig: Take  by mouth daily.    citalopram (CELEXA) 20 mg tablet 7/23/2018 at Unknown time  Yes Yes   Sig: Take 20 mg by mouth daily. cyclobenzaprine (FLEXERIL) 5 mg tablet 7/22/2018  Yes No   Sig: Take 5 mg by mouth as needed for Muscle Spasm(s). mesalamine ER (APRISO) 0.375 gram capsule 7/23/2018 at Unknown time  Yes Yes   Sig: Take 1.5 g by mouth daily. turmeric root extract 500 mg cap 7/22/2018  Yes Yes   Sig: Take 1 Cap by mouth daily. zolpidem (AMBIEN) 10 mg tablet 7/17/2018 at Unknown time  Yes Yes   Sig: Take 5 mg by mouth nightly as needed for Sleep. Facility-Administered Medications: None       PHYSICAL EXAM:  The patient is examined immediately prior to the procedure. Visit Vitals    /65    Pulse 71    Temp 98 °F (36.7 °C)    Resp 14    Ht 5' 6\" (1.676 m)    Wt 76.2 kg (168 lb)    SpO2 99%    Breastfeeding No    BMI 27.12 kg/m2     Gen: Appears comfortable, no distress. Pulm: comfortable respirations with no abnormal audible breath sounds  CV: heart regular, well perfused  GI: abdomen flat. PLAN:  Informed consent discussion held, patient afforded an opportunity to ask questions and all questions answered. After being advised of the risks, benefits, and alternatives, the patient requested that we proceed and indicated so on a written consent form. Will proceed with procedure as planned.   Gerald Bryant MD No

## 2022-03-18 PROBLEM — K21.9 GERD (GASTROESOPHAGEAL REFLUX DISEASE): Status: ACTIVE | Noted: 2020-07-05

## 2022-03-18 PROBLEM — D64.9 ANEMIA: Status: ACTIVE | Noted: 2020-07-05

## 2022-03-18 PROBLEM — A04.72 C. DIFFICILE DIARRHEA: Status: ACTIVE | Noted: 2020-07-06

## 2022-03-18 PROBLEM — K51.90 ULCERATIVE COLITIS (HCC): Status: ACTIVE | Noted: 2020-07-05

## 2022-03-18 PROBLEM — D72.829 LEUKOCYTOSIS: Status: ACTIVE | Noted: 2020-07-05

## 2022-03-19 PROBLEM — K35.32 PERFORATED APPENDICITIS: Status: ACTIVE | Noted: 2021-06-30

## 2022-03-20 PROBLEM — R73.9 HYPERGLYCEMIA: Status: ACTIVE | Noted: 2020-07-05

## 2023-05-17 RX ORDER — ACYCLOVIR 400 MG/1
400 TABLET ORAL DAILY
COMMUNITY
Start: 2010-11-30

## 2023-05-17 RX ORDER — ACETAMINOPHEN 500 MG
1000 TABLET ORAL 2 TIMES DAILY
COMMUNITY

## 2023-05-17 RX ORDER — ZOLPIDEM TARTRATE 5 MG/1
5 TABLET ORAL NIGHTLY PRN
COMMUNITY

## 2023-05-17 RX ORDER — TRAMADOL HYDROCHLORIDE 50 MG/1
50 TABLET ORAL
COMMUNITY

## 2023-05-17 RX ORDER — MESALAMINE 0.38 G/1
1.5 CAPSULE, EXTENDED RELEASE ORAL DAILY
COMMUNITY

## 2023-05-17 RX ORDER — CYCLOBENZAPRINE HCL 10 MG
10 TABLET ORAL
COMMUNITY

## 2023-05-17 RX ORDER — FOLIC ACID 1 MG/1
1 TABLET ORAL DAILY
COMMUNITY

## 2023-05-17 RX ORDER — ATORVASTATIN CALCIUM 20 MG/1
20 TABLET, FILM COATED ORAL NIGHTLY
COMMUNITY

## 2023-05-17 RX ORDER — CITALOPRAM 20 MG/1
20 TABLET ORAL DAILY
COMMUNITY
Start: 2010-11-30

## 2023-05-17 RX ORDER — ATENOLOL 25 MG/1
25 TABLET ORAL DAILY
COMMUNITY

## 2023-12-07 RX ORDER — MELATONIN 10 MG
10 CAPSULE ORAL NIGHTLY
COMMUNITY

## 2023-12-07 NOTE — H&P
68 y.o. female for open access colonoscopy for screening   Additional data for completion of the targeted pre-endoscopy H&P will be provided under 'H&P interval notes'. Please see that document which will be attached to this. Steve Holbrook MD    Ulcerative colitis on remicade. For surveillance colonoscopy.

## 2023-12-08 ENCOUNTER — ANESTHESIA (OUTPATIENT)
Facility: HOSPITAL | Age: 73
End: 2023-12-08
Payer: MEDICARE

## 2023-12-08 ENCOUNTER — HOSPITAL ENCOUNTER (OUTPATIENT)
Facility: HOSPITAL | Age: 73
Setting detail: OUTPATIENT SURGERY
Discharge: HOME | End: 2023-12-08
Attending: SPECIALIST | Admitting: SPECIALIST
Payer: MEDICARE

## 2023-12-08 ENCOUNTER — ANESTHESIA EVENT (OUTPATIENT)
Facility: HOSPITAL | Age: 73
End: 2023-12-08
Payer: MEDICARE

## 2023-12-08 VITALS
OXYGEN SATURATION: 100 % | RESPIRATION RATE: 15 BRPM | WEIGHT: 170.19 LBS | SYSTOLIC BLOOD PRESSURE: 113 MMHG | HEART RATE: 74 BPM | DIASTOLIC BLOOD PRESSURE: 60 MMHG | TEMPERATURE: 97.9 F | BODY MASS INDEX: 27.35 KG/M2 | HEIGHT: 66 IN

## 2023-12-08 PROCEDURE — 2500000003 HC RX 250 WO HCPCS: Performed by: NURSE ANESTHETIST, CERTIFIED REGISTERED

## 2023-12-08 PROCEDURE — 3600007502: Performed by: SPECIALIST

## 2023-12-08 PROCEDURE — 3700000000 HC ANESTHESIA ATTENDED CARE: Performed by: SPECIALIST

## 2023-12-08 PROCEDURE — 6360000002 HC RX W HCPCS: Performed by: NURSE ANESTHETIST, CERTIFIED REGISTERED

## 2023-12-08 PROCEDURE — 7100000010 HC PHASE II RECOVERY - FIRST 15 MIN: Performed by: SPECIALIST

## 2023-12-08 PROCEDURE — 3700000001 HC ADD 15 MINUTES (ANESTHESIA): Performed by: SPECIALIST

## 2023-12-08 PROCEDURE — 3600007512: Performed by: SPECIALIST

## 2023-12-08 PROCEDURE — 2580000003 HC RX 258: Performed by: SPECIALIST

## 2023-12-08 PROCEDURE — 7100000011 HC PHASE II RECOVERY - ADDTL 15 MIN: Performed by: SPECIALIST

## 2023-12-08 PROCEDURE — 88305 TISSUE EXAM BY PATHOLOGIST: CPT

## 2023-12-08 RX ORDER — EPHEDRINE SULFATE/0.9% NACL/PF 50 MG/5 ML
SYRINGE (ML) INTRAVENOUS PRN
Status: DISCONTINUED | OUTPATIENT
Start: 2023-12-08 | End: 2023-12-08 | Stop reason: SDUPTHER

## 2023-12-08 RX ORDER — LIDOCAINE HCL/PF 100 MG/5ML
SYRINGE (ML) INJECTION PRN
Status: DISCONTINUED | OUTPATIENT
Start: 2023-12-08 | End: 2023-12-08 | Stop reason: SDUPTHER

## 2023-12-08 RX ORDER — INFLIXIMAB 100 MG/10ML
5 INJECTION, POWDER, LYOPHILIZED, FOR SOLUTION INTRAVENOUS SEE ADMIN INSTRUCTIONS
COMMUNITY

## 2023-12-08 RX ORDER — SIMETHICONE 20 MG/.3ML
40 EMULSION ORAL
Status: DISCONTINUED | OUTPATIENT
Start: 2023-12-08 | End: 2023-12-08 | Stop reason: HOSPADM

## 2023-12-08 RX ORDER — SODIUM CHLORIDE 9 MG/ML
INJECTION, SOLUTION INTRAVENOUS CONTINUOUS
Status: DISCONTINUED | OUTPATIENT
Start: 2023-12-08 | End: 2023-12-08 | Stop reason: HOSPADM

## 2023-12-08 RX ORDER — SODIUM CHLORIDE 0.9 % (FLUSH) 0.9 %
5-40 SYRINGE (ML) INJECTION PRN
Status: DISCONTINUED | OUTPATIENT
Start: 2023-12-08 | End: 2023-12-08 | Stop reason: HOSPADM

## 2023-12-08 RX ORDER — PROPOFOL 10 MG/ML
INJECTION, EMULSION INTRAVENOUS CONTINUOUS PRN
Status: DISCONTINUED | OUTPATIENT
Start: 2023-12-08 | End: 2023-12-08 | Stop reason: SDUPTHER

## 2023-12-08 RX ADMIN — LIDOCAINE HYDROCHLORIDE 60 MG: 20 INJECTION INTRAVENOUS at 10:27

## 2023-12-08 RX ADMIN — PROPOFOL 50 MG: 10 INJECTION, EMULSION INTRAVENOUS at 10:27

## 2023-12-08 RX ADMIN — SODIUM CHLORIDE: 9 INJECTION, SOLUTION INTRAVENOUS at 10:00

## 2023-12-08 RX ADMIN — PROPOFOL 20 MG: 10 INJECTION, EMULSION INTRAVENOUS at 10:28

## 2023-12-08 RX ADMIN — PROPOFOL 140 MCG/KG/MIN: 10 INJECTION, EMULSION INTRAVENOUS at 10:29

## 2023-12-08 RX ADMIN — Medication 20 MG: at 10:50

## 2023-12-08 RX ADMIN — LIDOCAINE HYDROCHLORIDE 20 MG: 20 INJECTION INTRAVENOUS at 10:29

## 2023-12-08 ASSESSMENT — PAIN SCALES - GENERAL
PAINLEVEL_OUTOF10: 0

## 2023-12-08 ASSESSMENT — PAIN - FUNCTIONAL ASSESSMENT: PAIN_FUNCTIONAL_ASSESSMENT: 0-10

## 2023-12-08 ASSESSMENT — PAIN DESCRIPTION - DESCRIPTORS: DESCRIPTORS: ACHING

## 2023-12-08 NOTE — ANESTHESIA PRE PROCEDURE
Department of Anesthesiology  Preprocedure Note       Name:  Phil Goodman   Age:  68 y.o.  :  1950                                          MRN:  [id number]         Date:  2023      Surgeon: Maryjane Greene):  Jolene Mejia MD    Procedure: Procedure(s):  COLONOSCOPY    Medications prior to admission:   Prior to Admission medications    Medication Sig Start Date End Date Taking? Authorizing Provider   inFLIXimab (REMICADE) 100 MG injection Infuse 5 mg/kg intravenously See Admin Instructions   Yes Marcelo Hutton MD   Multiple Vitamin (MULTIVITAMIN PO) Take 1 tablet by mouth daily   Yes ProviderMarcelo MD   melatonin 10 MG CAPS capsule Take 1 capsule by mouth nightly   Yes Marcelo Hutton MD   acetaminophen (TYLENOL) 500 MG tablet Take 2 tablets by mouth 2 times daily    Automatic Reconciliation, Ar   acyclovir (ZOVIRAX) 400 MG tablet Take 1 tablet by mouth daily 11/30/10   Automatic Reconciliation, Ar   atenolol (TENORMIN) 25 MG tablet Take 1 tablet by mouth daily    Automatic Reconciliation, Ar   atorvastatin (LIPITOR) 20 MG tablet Take 1 tablet by mouth nightly    Automatic Reconciliation, Ar   citalopram (CELEXA) 20 MG tablet Take 1 tablet by mouth daily 11/30/10   Automatic Reconciliation, Ar   cyclobenzaprine (FLEXERIL) 10 MG tablet Take 1 tablet by mouth    Automatic Reconciliation, Ar   folic acid (FOLVITE) 1 MG tablet Take 1 tablet by mouth daily    Automatic Reconciliation, Ar   mesalamine (APRISO) 0.375 g extended release capsule Take 4 capsules by mouth daily    Automatic Reconciliation, Ar   methotrexate (RHEUMATREX) 2.5 MG chemo tablet Take 8 tablets by mouth    Automatic Reconciliation, Ar   traMADol (ULTRAM) 50 MG tablet Take 1 tablet by mouth once as needed. Automatic Reconciliation, Ar   zolpidem (AMBIEN) 5 MG tablet Take 1 tablet by mouth nightly as needed.     Automatic Reconciliation, Ar       Current medications:    Current Facility-Administered Medications

## 2023-12-08 NOTE — DISCHARGE INSTRUCTIONS
[address] [facility] CLARISA Ferguson MD  [phone]      December 8, 2023    Phil Goodman  YOB: 1950    COLONOSCOPY DISCHARGE INSTRUCTIONS    If there is redness at IV site you should apply warm compress to area. If redness or soreness persist contact Dr. Batool Ferguson' or your primary care doctor. There may be a slight amount of blood passed from the rectum. Gaseous discomfort may develop, but walking, belching will help relieve this. You may not operate a vehicle for 12 hours  You may not operate machinery or dangerous appliances for rest of today  You may not drink alcoholic beverages for 12 hours  Avoid making any critical decisions for 24 hours    DIET:  You may resume your normal diet, but some patients find that heavy or large meals may lead to indigestion or vomiting. I suggest a light meal as first food intake. MEDICATIONS:  The use of some over-the-counter pain medication may lead to bleeding after colon biopsies or polyp removal.  Tylenol (also called acetaminophen) is safe to take even if you have had colonoscopy with polyp removal.  Based on the procedure you had today you may not safely take aspirin or aspirin-like products for the next ten (10) days. Remember that Tylenol (also called acetaminophen) is safe to take after colonoscopy even if you have had biopsies or polyps removed. ACTIVITY:  You may resume your normal household activities, but it is recommended that you spend the remainder of the day resting -  avoid any strenuous activity. CALL DR. Batool Ferguson' OFFICE IF:  Increasing pain, nausea, vomiting  Abdominal distension (swelling)  Significant new or increased bleeding (oral or rectal)  Fever/Chills  Chest pain/shortness of breath                       Additional instructions:   No aspirin 10 days. We found no active inflammation but sampled the surface of the colon; and we removed one polyp.

## 2023-12-08 NOTE — PROGRESS NOTES
[facility]  1950  [id number]    Situation:  Verbal report received from: Kelsie Radford RN   Procedure: Procedure(s):  COLONOSCOPY  COLONOSCOPY BIOPSY/STOMA  COLONOSCOPY POLYPECTOMY SNARE/COLD BIOPSY    Background:    Preoperative diagnosis: Family history of malignant neoplasm of gastrointestinal tract [Z80.0]  Personal history of colonic polyps [Z86.010]  Postoperative diagnosis: * No post-op diagnosis entered *    :  Dr. Anneliese Rodriguez   Assistant(s): Circulator: Miguel Gonzalez RN  Endoscopy Technician: Adalid Riddle    Specimens: [unfilled]  H. Pylori    no    Assessment:  Intra-procedure medications     Anesthesia gave intra-procedure sedation and medications, see anesthesia flow sheet   yes    Intravenous fluids: NS@ KVO     Vital signs stable   yes    Abdominal assessment: round and soft   yhes    Recommendation:  Discharge patient per MD order  yes .   Return to floor  outpatient  Family or Friend   family   Permission to share finding with family or friend   yes

## 2023-12-08 NOTE — PROGRESS NOTES
Endoscopy discharge instructions have been reviewed and given to patient. The patient verbalized understanding and acceptance of instructions. Dr. Edis Cassidy  discussed with spouse procedure findings and next steps.

## 2023-12-08 NOTE — ANESTHESIA POSTPROCEDURE EVALUATION
Department of Anesthesiology  Postprocedure Note    Patient: Luis Carrera  MRN: [id number]  YOB: 1950  Date of evaluation: 12/8/2023      Procedure Summary       Date: 12/08/23 Room / Location: [facility] ENDO 03 / [facility] ENDOSCOPY    Anesthesia Start: 1022 Anesthesia Stop: 1053    Procedures:       COLONOSCOPY (Lower GI Region)      COLONOSCOPY BIOPSY/STOMA (Lower GI Region)      COLONOSCOPY POLYPECTOMY SNARE/COLD BIOPSY (Lower GI Region) Diagnosis:       Family history of malignant neoplasm of gastrointestinal tract      Personal history of colonic polyps      (Family history of malignant neoplasm of gastrointestinal tract [Z80.0])      (Personal history of colonic polyps [Z86.010])    Surgeons: Yokasta Tomas MD Responsible Provider: Nathaniel Jean MD    Anesthesia Type: TIVA, MAC ASA Status: 3            Anesthesia Type: No value filed.     Jose M Phase I: Jose M Score: 10    Jose M Phase II: Jose M Score: 10      Anesthesia Post Evaluation

## (undated) DEVICE — ADULT SPO2 SENSOR: Brand: NELLCOR

## (undated) DEVICE — RESERVOIR,SUCTION,100CC,SILICONE: Brand: MEDLINE

## (undated) DEVICE — DRAIN SURG WND 15 FR RND TIP SIL STRL LF DISP

## (undated) DEVICE — LAPAROSCOPIC TROCAR SLEEVE/SINGLE USE: Brand: KII® OPTICAL ACCESS SYSTEM

## (undated) DEVICE — CLICKLINE SCISSORS INSERT: Brand: CLICKLINE

## (undated) DEVICE — Device: Brand: ENDOGATOR

## (undated) DEVICE — SET GRAV CK VLV NEEDLESS ST 3 GANGED 4WAY STPCOCK HI FLO 10

## (undated) DEVICE — RELOAD STPL L45MM H1.5-3.6MM REG TISS BLU GRIPPING SURF B

## (undated) DEVICE — TROCAR: Brand: KII® OPTICAL ACCESS SYSTEM

## (undated) DEVICE — FORCEPS BX L240CM JAW DIA2.8MM L CAP W/ NDL MIC MESH TOOTH

## (undated) DEVICE — CANNULA CUSH AD W/ 14FT TBG

## (undated) DEVICE — CANN NASAL O2 CAPNOGRAPHY AD -- FILTERLINE

## (undated) DEVICE — KENDALL RADIOLUCENT FOAM MONITORING ELECTRODE -RECTANGULAR SHAPE: Brand: KENDALL

## (undated) DEVICE — KIT COLON W/ 1.1OZ LUB AND 2 END

## (undated) DEVICE — POLYP TRAP: Brand: TRAPEASE®

## (undated) DEVICE — SIMPLICITY FLUFF UNDERPAD 23X36, MODERATE: Brand: SIMPLICITY

## (undated) DEVICE — SOLIDIFIER MEDC 1200ML -- CONVERT TO 356117

## (undated) DEVICE — STAPLER INT L340MM 45MM STD 12 FIRING B FRM PWR + GRIPPING

## (undated) DEVICE — SNARE ENDOSCP M L240CM W27MM SHTH DIA2.4MM CHN 2.8MM OVL

## (undated) DEVICE — 3M™ CUROS™ DISINFECTING CAP FOR NEEDLELESS CONNECTORS 270/CARTON 20 CARTONS/CASE CFF1-270: Brand: CUROS™

## (undated) DEVICE — SET ADMIN 16ML TBNG L100IN 2 Y INJ SITE IV PIGGY BK DISP

## (undated) DEVICE — ELECTRODE,RADIOTRANSLUCENT,FOAM,3PK: Brand: MEDLINE

## (undated) DEVICE — CATH IV AUTOGRD BC PNK 20GA 25 -- INSYTE

## (undated) DEVICE — TROCAR: Brand: KII® SLEEVE

## (undated) DEVICE — BAG BELONG PT PERS CLEAR HANDL

## (undated) DEVICE — CONTAINER SPEC 20 ML LID NEUT BUFF FORMALIN 10 % POLYPR STS

## (undated) DEVICE — APPLIER CLP M/L SHFT DIA5MM 15 LIG LIGAMAX 5

## (undated) DEVICE — CANISTER, RIGID, 3000CC: Brand: MEDLINE INDUSTRIES, INC.

## (undated) DEVICE — 1200 GUARD II KIT W/5MM TUBE W/O VAC TUBE: Brand: GUARDIAN

## (undated) DEVICE — Device

## (undated) DEVICE — UNIT PULSE OXMTR PORTABLE SPO2 PT MONITORING NELLCOR

## (undated) DEVICE — SUTURE SZ 0 27IN 5/8 CIR UR-6  TAPER PT VIOLET ABSRB VICRYL J603H

## (undated) DEVICE — SUTURE VCRL SZ 4-0 L18IN ABSRB UD L19MM PS-2 3/8 CIR PRIM J496H

## (undated) DEVICE — BAG SPEC BIOHZRD 10 X 10 IN --

## (undated) DEVICE — GENERAL LAPAROSCOPY-SFMC: Brand: MEDLINE INDUSTRIES, INC.

## (undated) DEVICE — SOL IRRIGATION INJ NACL 0.9% 500ML BTL

## (undated) DEVICE — TISSUE RETRIEVAL SYSTEM: Brand: INZII RETRIEVAL SYSTEM

## (undated) DEVICE — STERILE POLYISOPRENE POWDER-FREE SURGICAL GLOVES: Brand: PROTEXIS

## (undated) DEVICE — SHEARS ENDOSCP L36CM DIA5MM ULTRASONIC CRV TIP W/ ADV

## (undated) DEVICE — TRAP SUC MUCOUS 70ML -- MEDICHOICE MEDLINE

## (undated) DEVICE — DEVICE TRNSF SPIK STL 2008S] MICROTEK MEDICAL INC]

## (undated) DEVICE — CATH IV AUTOGRD BC BLU 22GA 25 -- INSYTE

## (undated) DEVICE — INSUFFLATION NEEDLE: Brand: SURGINEEDLE

## (undated) DEVICE — SYR 50ML SLIP TIP NSAF LF STRL --

## (undated) DEVICE — DERMABOND SKIN ADH 0.7ML -- DERMABOND ADVANCED 12/BX